# Patient Record
Sex: FEMALE | URBAN - METROPOLITAN AREA
[De-identification: names, ages, dates, MRNs, and addresses within clinical notes are randomized per-mention and may not be internally consistent; named-entity substitution may affect disease eponyms.]

---

## 2020-10-20 ENCOUNTER — VIRTUAL VISIT (OUTPATIENT)
Dept: FAMILY MEDICINE CLINIC | Age: 40
End: 2020-10-20

## 2020-10-20 ENCOUNTER — TELEPHONE (OUTPATIENT)
Dept: FAMILY MEDICINE CLINIC | Age: 40
End: 2020-10-20

## 2020-10-20 DIAGNOSIS — I10 ESSENTIAL HYPERTENSION: Primary | ICD-10-CM

## 2020-10-20 DIAGNOSIS — G47.00 INSOMNIA, UNSPECIFIED TYPE: ICD-10-CM

## 2020-10-20 DIAGNOSIS — K76.0 FATTY LIVER: ICD-10-CM

## 2020-10-20 DIAGNOSIS — R93.89 THICKENED ENDOMETRIUM: ICD-10-CM

## 2020-10-20 DIAGNOSIS — R79.89 ELEVATED LFTS: ICD-10-CM

## 2020-10-20 DIAGNOSIS — R73.01 IMPAIRED FASTING BLOOD SUGAR: ICD-10-CM

## 2020-10-20 DIAGNOSIS — E66.9 CLASS 1 OBESITY WITH SERIOUS COMORBIDITY IN ADULT, UNSPECIFIED BMI, UNSPECIFIED OBESITY TYPE: ICD-10-CM

## 2020-10-20 DIAGNOSIS — E78.2 MIXED HYPERLIPIDEMIA: ICD-10-CM

## 2020-10-20 PROCEDURE — 99203 OFFICE O/P NEW LOW 30 MIN: CPT | Performed by: INTERNAL MEDICINE

## 2020-10-20 RX ORDER — LISINOPRIL AND HYDROCHLOROTHIAZIDE 10; 12.5 MG/1; MG/1
TABLET ORAL DAILY
COMMUNITY
End: 2020-12-09 | Stop reason: SDUPTHER

## 2020-10-20 RX ORDER — SIMVASTATIN 40 MG/1
TABLET, FILM COATED ORAL
COMMUNITY
End: 2020-10-20 | Stop reason: SDUPTHER

## 2020-10-20 RX ORDER — AMLODIPINE BESYLATE 2.5 MG/1
2.5 TABLET ORAL DAILY
COMMUNITY
End: 2020-10-20 | Stop reason: ALTCHOICE

## 2020-10-20 RX ORDER — SIMVASTATIN 40 MG/1
40 TABLET, FILM COATED ORAL
Qty: 90 TAB | Refills: 1 | Status: SHIPPED | OUTPATIENT
Start: 2020-10-20 | End: 2021-01-20 | Stop reason: SDUPTHER

## 2020-10-20 RX ORDER — NORETHINDRONE ACETATE AND ETHINYL ESTRADIOL 1MG-20(21)
KIT ORAL
COMMUNITY
End: 2022-08-22 | Stop reason: ALTCHOICE

## 2020-10-20 NOTE — PROGRESS NOTES
Chief Complaint   Patient presents with   [facility] Establish Care    Neck Pain     Will get flu shot at pharmacy. OB/[facility]N [facility]-annual on 11/7/2020.

## 2020-10-20 NOTE — PROGRESS NOTES
I have attempted without success to contact patient by phone to check in for virtual visit.   [phone]

## 2020-10-20 NOTE — PROGRESS NOTES
Adair Townsend is a 46 y.o. female who was seen by synchronous (real-time) audio-video technology on 10/20/2020 for Establish Care and Neck Pain        Assessment & Plan:   Diagnoses and all orders for this visit:    1. Essential hypertension-advised home monitoring; plan is to discontinue low dose Norvasc and double up on the dose of Lisinopril HCT if BP goes over 135/85  -     CBC WITH AUTOMATED DIFF; Future    2. Mixed hyperlipidemia-goal LDL is less than 70 bec of DM  -     METABOLIC PANEL, COMPREHENSIVE; Future  -     LIPID PANEL; Future  -     simvastatin (ZOCOR) 40 mg tablet; Take 1 Tab by mouth nightly. 3. Impaired fasting blood sugar-advised weight loss  -     HEMOGLOBIN A1C WITH EAG; Future    4. Elevated LFTs-likely from fatty liver seen on US; may need to back off a bit on statin if this is higher than it was    5. Class 1 obesity with serious comorbidity in adult, unspecified BMI, unspecified obesity type-discussed limiting delroy to 7126-8211/day and exercising at least 150 min a week    6. Insomnia, unspecified type-advised on sleep hygiene and to try Melatonin first 3 mg to 10 mg at night    7. Thickened endometrium-on OCP for this; has follow up ith Gyn soon    8. Fatty liver-has not seen GI for this; weight loss imperative    9. 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        [id number]  Subjective:     Health Maintenance Due   Topic Date Due    A1C test (Diabetic or Prediabetic)  12/19/1991    Lipid Screen  12/19/1991    DTaP/Tdap/Td series (1 - Tdap) 12/19/2002    PAP AKA CERVICAL CYTOLOGY  12/19/2002    Flu Vaccine (1) 09/01/2020     First visit with me; recommended by my patient Jaqueline Paget  Was prev seeing Guillermina ren-not seen in a while, Dr was on maternity leave then pandemic happened    On Lisinopril HCT and Norvasc  Home BP high so Amlodipine added recently, but found out recently that BP cuff not calibrated when she brought this to her dad'd doctor's appt and the discrepancy was high; she will try to get a new one    FBS was 106 blood sugar 10/2019  Mom with DM  Last A1C was 5.6 in 2015  Denies polyuria, polydipsia and polyphagia      LIPID COMPLETE PANELResulted: 10/31/2019 9:29 PM  [address]  Component Name Value Ref Range   Cholesterol 180 110 - 200 mg/dL   Triglyceride 180 (H) 40 - 149 mg/dL   HDL 42 40 - 59 mg/dL   Cholesterol/HDL 4.3 0.0 - 5.0    LDL CALCULATION 102 (H) 50 - 99 mg/dL   VLDL CALCULATION 36 (H) 8 - 30 mg/dL   On Simvastatin-needs refill  [facility] chart reviewed: prior to starting Simvastatin, her cholesterol levels were over 300      On OCP-restarted it bec of thickening of endometrial lining-[facility]  appt next month  However, OCP is not controlling amount of menses which it was supposed to do instead of undergoing a D and C- some periods are very heavy and some are almost nonthing      elev LFT to 116 ALT in 2018, then down to 50 in 2019-US showed fatty liver    Prior to Admission medications    Medication Sig Start Date End Date Taking? Authorizing Provider   lisinopril-hydroCHLOROthiazide (PRINZIDE, ZESTORETIC) 10-12.5 mg per tablet Take  by mouth daily. Yes Provider, Historical   amLODIPine (NORVASC) 2.5 mg tablet Take 2.5 mg by mouth daily. Yes Provider, Historical   simvastatin (ZOCOR) 40 mg tablet Take  by mouth nightly.    Yes Provider, Historical   norethindrone-ethinyl estradiol (Junel FE 1/20, 28,) 1 mg-20 mcg (21)/75 mg (7) tab Junel FE 1/20 (28) 1 mg-20 mcg (21)/75 mg (7) tablet   Yes Provider, Historical     Patient Active Problem List    Diagnosis Date Noted    Essential hypertension 10/20/2020    Mixed hyperlipidemia 10/20/2020    Impaired fasting blood sugar 10/20/2020    Elevated LFTs 10/20/2020    Class 1 obesity with serious comorbidity in adult 10/20/2020    Insomnia 10/20/2020    Thickened endometrium 10/20/2020    Fatty liver 10/20/2020     Family History   Problem Relation Age of Onset    Stroke Mother     Hypertension Mother     No Known Problems Father      ROS  Pt denies: Wt loss, Fever/Chills, HA, Visual changes, Fatigue, Chest pain, SOB, COELLO, Abd pain, N/V/D/C, Blood in stool or urine, Edema. Pertinent positive as above in HPI. All others were negative  Objective:   No flowsheet data found. General: alert, cooperative, no distress   Mental  status: normal mood, behavior, speech, dress, motor activity, and thought processes, able to follow commands   HENT: NCAT   Neck: no visualized mass   Resp: no respiratory distress   Neuro: no gross deficits   Skin: no discoloration or lesions of concern on visible areas   Psychiatric: normal affect, consistent with stated mood, no evidence of hallucinations     Additional exam findings: obese      We discussed the expected course, resolution and complications of the diagnosis(es) in detail. Medication risks, benefits, costs, interactions, and alternatives were discussed as indicated. I advised her to contact the office if her condition worsens, changes or fails to improve as anticipated. She expressed understanding with the diagnosis(es) and plan. Dae Aguilera, who was evaluated through a patient-initiated, synchronous (real-time) audio-video encounter, and/or her healthcare decision maker, is aware that it is a billable service, with coverage as determined by her insurance carrier. She provided verbal consent to proceed: Yes, and patient identification was verified. It was conducted pursuant to the emergency declaration under the [address] [place] [address], [address] [place] [address] authority and the [employer] and [employer] General Act. A caregiver was present when appropriate.  Ability to conduct physical exam was limited. I was at home. The patient was at home.       Amber Bright MD

## 2020-10-20 NOTE — PATIENT INSTRUCTIONS
Starting a Weight Loss Plan: Care Instructions Your Care Instructions If you are thinking about losing weight, it can be hard to know where to start. Your doctor can help you set up a weight loss plan that best meets your needs. You may want to take a class on nutrition or exercise, or join a weight loss support group. If you have questions about how to make changes to your eating or exercise habits, ask your doctor about seeing a registered dietitian or an exercise specialist. 
It can be a big challenge to lose weight. But you do not have to make huge changes at once. Make small changes, and stick with them. When those changes become habit, add a few more changes. If you do not think you are ready to make changes right now, try to pick a date in the future. Make an appointment to see your doctor to discuss whether the time is right for you to start a plan. Follow-up care is a key part of your treatment and safety. Be sure to make and go to all appointments, and call your doctor if you are having problems. It's also a good idea to know your test results and keep a list of the medicines you take. How can you care for yourself at home? · Set realistic goals. Many people expect to lose much more weight than is likely. A weight loss of 5% to 10% of your body weight may be enough to improve your health. · Get family and friends involved to provide support. Talk to them about why you are trying to lose weight, and ask them to help. They can help by participating in exercise and having meals with you, even if they may be eating something different. · Find what works best for you. If you do not have time or do not like to cook, a program that offers meal replacement bars or shakes may be better for you. Or if you like to prepare meals, finding a plan that includes daily menus and recipes may be best. 
· Ask your doctor about other health professionals who can help you achieve your weight loss goals. ? A dietitian can help you make healthy changes in your diet. ? An exercise specialist or  can help you develop a safe and effective exercise program. 
? A counselor or psychiatrist can help you cope with issues such as depression, anxiety, or family problems that can make it hard to focus on weight loss. · Consider joining a support group for people who are trying to lose weight. Your doctor can suggest groups in your area. Where can you learn more? Go to http://www.gray.com/ Enter S554 in the search box to learn more about \"Starting a Weight Loss Plan: Care Instructions. \" Current as of: December 11, 2019               Content Version: 12.6 © 2131-8895 [employer]. Care instructions adapted under license by [employer] (which disclaims liability or warranty for this information). If you have questions about a medical condition or this instruction, always ask your healthcare professional. [address] any warranty or liability for your use of this information.

## 2020-11-17 LAB
A-G RATIO,AGRAT: 1.4 RATIO (ref 1.1–2.6)
ABSOLUTE LYMPHOCYTE COUNT, 10803: 3.5 K/UL (ref 1–4.8)
ALBUMIN SERPL-MCNC: 4.8 G/DL (ref 3.5–5)
ALP SERPL-CCNC: 53 U/L (ref 25–115)
ALT SERPL-CCNC: 30 U/L (ref 5–40)
ANION GAP SERPL CALC-SCNC: 15.4 MMOL/L (ref 3–15)
AST SERPL W P-5'-P-CCNC: 24 U/L (ref 10–37)
BASOPHILS # BLD: 0.2 K/UL (ref 0–0.2)
BASOPHILS NFR BLD: 2 % (ref 0–2)
BILIRUB SERPL-MCNC: 0.6 MG/DL (ref 0.2–1.2)
BUN SERPL-MCNC: 9 MG/DL (ref 6–22)
CALCIUM SERPL-MCNC: 10.1 MG/DL (ref 8.4–10.5)
CHLORIDE SERPL-SCNC: 99 MMOL/L (ref 98–110)
CHOLEST SERPL-MCNC: 247 MG/DL (ref 110–200)
CO2 SERPL-SCNC: 23 MMOL/L (ref 20–32)
CREAT SERPL-MCNC: 0.4 MG/DL (ref 0.5–1.2)
EOSINOPHIL # BLD: 0.5 K/UL (ref 0–0.5)
EOSINOPHIL NFR BLD: 5 % (ref 0–6)
ERYTHROCYTE [DISTWIDTH] IN BLOOD BY AUTOMATED COUNT: 12.4 % (ref 10–15.5)
GFRAA, 66117: >60
GFRNA, 66118: >60
GLOBULIN,GLOB: 3.5 G/DL (ref 2–4)
GLUCOSE SERPL-MCNC: 92 MG/DL (ref 70–99)
GRANULOCYTES,GRANS: 53 % (ref 40–75)
HCT VFR BLD AUTO: 41.7 % (ref 35.1–46.5)
HDLC SERPL-MCNC: 44 MG/DL
HDLC SERPL-MCNC: 5.6 MG/DL (ref 0–5)
HGB BLD-MCNC: 14.2 G/DL (ref 11.7–15.5)
LDL, DIRECT,DLDL: 164 MG/DL (ref 50–99)
LDL/HDL RATIO,LDHD: 3.7
LDLC SERPL CALC-MCNC: ABNORMAL MG/DL
LYMPHOCYTES, LYMLT: 35 % (ref 20–45)
MCH RBC QN AUTO: 30 PG (ref 26–34)
MCHC RBC AUTO-ENTMCNC: 34 G/DL (ref 31–36)
MCV RBC AUTO: 89 FL (ref 81–99)
MONOCYTES # BLD: 0.6 K/UL (ref 0.1–1)
MONOCYTES NFR BLD: 6 % (ref 3–12)
NEUTROPHILS # BLD AUTO: 5.3 K/UL (ref 1.8–7.7)
NON-HDL CHOLESTEROL, 011976: 203 MG/DL
PLATELET # BLD AUTO: 358 K/UL (ref 140–440)
PMV BLD AUTO: 10.1 FL (ref 9–13)
POTASSIUM SERPL-SCNC: 3.9 MMOL/L (ref 3.5–5.5)
PROT SERPL-MCNC: 8.3 G/DL (ref 6.4–8.3)
RBC # BLD AUTO: 4.68 M/UL (ref 3.8–5.2)
SODIUM SERPL-SCNC: 137 MMOL/L (ref 133–145)
TRIGL SERPL-MCNC: 453 MG/DL (ref 40–149)
VLDLC SERPL CALC-MCNC: 91 MG/DL (ref 8–30)
WBC # BLD AUTO: 10.1 K/UL (ref 4–11)

## 2020-11-18 LAB
AVG GLU, 10930: 122 MG/DL (ref 91–123)
HBA1C MFR BLD HPLC: 5.9 % (ref 4.8–5.6)

## 2021-01-20 ENCOUNTER — VIRTUAL VISIT (OUTPATIENT)
Dept: FAMILY MEDICINE CLINIC | Age: 41
End: 2021-01-20
Payer: COMMERCIAL

## 2021-01-20 DIAGNOSIS — E78.2 MIXED HYPERLIPIDEMIA: ICD-10-CM

## 2021-01-20 DIAGNOSIS — G47.00 INSOMNIA, UNSPECIFIED TYPE: ICD-10-CM

## 2021-01-20 DIAGNOSIS — I10 ESSENTIAL HYPERTENSION: Primary | ICD-10-CM

## 2021-01-20 DIAGNOSIS — R73.01 IMPAIRED FASTING BLOOD SUGAR: ICD-10-CM

## 2021-01-20 DIAGNOSIS — K76.0 FATTY LIVER: ICD-10-CM

## 2021-01-20 PROCEDURE — 99214 OFFICE O/P EST MOD 30 MIN: CPT | Performed by: INTERNAL MEDICINE

## 2021-01-20 RX ORDER — SIMVASTATIN 40 MG/1
40 TABLET, FILM COATED ORAL
Qty: 90 TAB | Refills: 1 | Status: SHIPPED | OUTPATIENT
Start: 2021-01-20 | End: 2021-07-29 | Stop reason: SDUPTHER

## 2021-01-20 NOTE — PATIENT INSTRUCTIONS

## 2021-01-20 NOTE — PROGRESS NOTES
Iris David is a 45 y.o. female who was seen by synchronous (real-time) audio-video technology on 1/20/2021 for Follow Up Chronic Condition (3 month; HTN, Cholesterol)        Assessment & Plan:   Diagnoses and all orders for this visit:    1. Essential hypertension-continue with Lisinopril/HCT and home monitoring  -     CBC WITH AUTOMATED DIFF; Future  -     METABOLIC PANEL, COMPREHENSIVE; Future    2. Mixed hyperlipidemia-continue with low fat diet, Zocor and fish oil; patient wants to know first if her addition of  fish oil will work prior to me changing the Zocor to another statin such as Crestor or Lipitor  -     simvastatin (ZOCOR) 40 mg tablet; Take 1 Tab by mouth nightly. -     LIPID PANEL; Future    3. Impaired fasting blood sugar-continue with efforts at weight loss  -     METABOLIC PANEL, COMPREHENSIVE; Future  -     HEMOGLOBIN A1C WITH EAG; Future    4. Insomnia, unspecified type-resolved    5. Fatty liver-continue with weight loss efforts        Follow-up and Dispositions    · Return in about 3 months (around 4/20/2021) for follow up. Routing History      will do labs at [facility] 1 week prior to appt  7will do labs at [facility] 1 week prior to appt12  Subjective:     Health Maintenance Due   Topic Date Due    PAP AKA CERVICAL CYTOLOGY -c/o gyn 12/19/2002    Flu Vaccine (1)-will get it this month 09/01/2020     Follow up from last visit:  1. Essential hypertension-advised home monitoring; plan is to discontinue low dose Norvasc and double up on the dose of Lisinopril HCT if BP goes over 135/85  -     CBC WITH AUTOMATED DIFF; Future     Home BPs without low dose Norvasc-121-83/85      2. Mixed hyperlipidemia-goal LDL is less than 70 bec of DM  -     METABOLIC PANEL, COMPREHENSIVE; Future  -     LIPID PANEL; Future  -     simvastatin (ZOCOR) 40 mg tablet;  Take 1 Tab by mouth nightly.     Lab Results   Component Value Date/Time    Cholesterol, total 247 (H) 11/17/2020 08:22 AM    HDL Cholesterol 44 11/17/2020 08:22 AM    LDL,Direct 164 (H) 11/17/2020 08:22 AM    LDL, calculated  11/17/2020 08:22 AM      Comment:      Triglyceride value is too high to calculate LDL. Lyndsey Fuentes Direct LDL is being  performed. VLDL, calculated 91 (H) 11/17/2020 08:22 AM    Triglyceride 453 (H) 11/17/2020 08:22 AM   Zocor- missed doses  Also taking fish oil which helped her before; now also shifted to  brown rice    3. Impaired fasting blood sugar-advised weight loss  -     HEMOGLOBIN A1C WITH EAG; Future     Lab Results   Component Value Date/Time    Hemoglobin A1c 5.9 (H) 11/17/2020 08:22 AM   Denies polyuria, polydipsia and polyphagia      4. Elevated LFTs-likely from fatty liver seen on US; may need to back off a bit on statin if this is higher than it was     Lab Results   Component Value Date/Time    ALT (SGPT) 30 11/17/2020 08:22 AM    AST (SGOT) 24 11/17/2020 08:22 AM    Alk. phosphatase 53 11/17/2020 08:22 AM    Bilirubin, total 0.6 11/17/2020 08:22 AM     5. Class 1 obesity with serious comorbidity in adult, unspecified BMI, unspecified obesity type-discussed limiting delroy to 7670-8116/day and exercising at least 150 min a week     recent weight-has not checked/scared     6. Insomnia, unspecified type-advised on sleep hygiene and to try Melatonin first 3 mg to 10 mg at night    Melatonin did not work  But insomnia is resolved since     7. Thickened endometrium-on OCP for this; has follow up ith Gyn soon   Saw Gyn-fibroids same size; up to her to have ablation she was told; wants to get off OCP which makes her gain weight and also with hair loss  No anemia    8. Fatty liver-has not seen GI for this; weight loss imperative     9. 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 Authorizing Provider   docosahexaenoic acid/epa (FISH OIL PO) Take 2,400 mg by mouth. Yes Provider, Historical   simvastatin (ZOCOR) 40 mg tablet Take 1 Tab by mouth nightly. 1/20/21  Yes Nina Alarcon MD   lisinopril-hydroCHLOROthiazide (PRINZIDE, ZESTORETIC) 10-12.5 mg per tablet Take 1 Tab by mouth daily. 12/9/20  Yes Marissa Adame MD   norethindrone-ethinyl estradiol (Junel FE 1/20, 28,) 1 mg-20 mcg (21)/75 mg (7) tab Junel FE 1/20 (28) 1 mg-20 mcg (21)/75 mg (7) tablet   Yes Provider, Historical   simvastatin (ZOCOR) 40 mg tablet Take 1 Tab by mouth nightly. 10/20/20 1/20/21  Marissa Adame MD     Patient Active Problem List    Diagnosis Date Noted    Essential hypertension 10/20/2020    Mixed hyperlipidemia 10/20/2020    Impaired fasting blood sugar 10/20/2020    Elevated LFTs 10/20/2020    Class 1 obesity with serious comorbidity in adult 10/20/2020    Insomnia 10/20/2020    Thickened endometrium 10/20/2020    Fatty liver 10/20/2020       ROS  Pt denies: Wt loss, Fever/Chills, HA, Visual changes, Fatigue, Chest pain, SOB, COELLO, Abd pain, N/V/D/C, Blood in stool or urine, Edema. Pertinent positive as above in HPI. All others were negative  Objective:   No flowsheet data found. General: alert, cooperative, no distress   Mental  status: normal mood, behavior, speech, dress, motor activity, and thought processes, able to follow commands   HENT: NCAT   Neck: no visualized mass   Resp: no respiratory distress   Neuro: no gross deficits   Skin: no discoloration or lesions of concern on visible areas   Psychiatric: normal affect, consistent with stated mood, no evidence of hallucinations     Additional exam findings: We discussed the expected course, resolution and complications of the diagnosis(es) in detail. Medication risks, benefits, costs, interactions, and alternatives were discussed as indicated.   I advised her to contact the office if her condition worsens, changes or fails to improve as anticipated. She expressed understanding with the diagnosis(es) and plan.       Enma Burroughs, who was evaluated through a patient-initiated, synchronous (real-time) audio-video encounter, and/or her healthcare decision maker, is aware that it is a billable service, with coverage as determined by her insurance carrier. She provided verbal consent to proceed: Yes, and patient identification was verified. It was conducted pursuant to the emergency declaration under the [place] Act and the National Emergencies Act, 1135 waiver authority and the Coronavirus Preparedness and Response Supplemental Appropriations Act. A caregiver was present when appropriate. Ability to conduct physical exam was limited. I was at home. The patient was at home.      Nina Alarcon MD

## 2021-01-20 NOTE — PROGRESS NOTES
Chief Complaint   Patient presents with    Follow Up Chronic Condition     3 month; HTN, Cholesterol       1. Have you been to the ER, urgent care clinic since your last visit? Hospitalized since your last visit? No    2. Have you seen or consulted any other health care providers outside of the [address] [place] [address] since your last visit? Include any pap smears or colon screening.  No     Health Maintenance Due   Topic    PAP AKA CERVICAL CYTOLOGY     Flu Vaccine (1)

## 2021-01-20 NOTE — PROGRESS NOTES
I have attempted without success to contact patient by phone to check in for virtual visit.   [phone] (H)

## 2021-06-03 ENCOUNTER — OFFICE VISIT (OUTPATIENT)
Dept: FAMILY MEDICINE CLINIC | Age: 41
End: 2021-06-03
Payer: COMMERCIAL

## 2021-06-03 VITALS
DIASTOLIC BLOOD PRESSURE: 89 MMHG | HEART RATE: 100 BPM | SYSTOLIC BLOOD PRESSURE: 139 MMHG | TEMPERATURE: 98.3 F | BODY MASS INDEX: 25.51 KG/M2 | WEIGHT: 138.6 LBS | HEIGHT: 62 IN | RESPIRATION RATE: 16 BRPM

## 2021-06-03 DIAGNOSIS — I10 ESSENTIAL HYPERTENSION: Primary | ICD-10-CM

## 2021-06-03 DIAGNOSIS — R73.01 IMPAIRED FASTING BLOOD SUGAR: ICD-10-CM

## 2021-06-03 DIAGNOSIS — D21.9 FIBROID: ICD-10-CM

## 2021-06-03 DIAGNOSIS — E78.2 MIXED HYPERLIPIDEMIA: ICD-10-CM

## 2021-06-03 PROCEDURE — 99214 OFFICE O/P EST MOD 30 MIN: CPT | Performed by: INTERNAL MEDICINE

## 2021-06-03 RX ORDER — LISINOPRIL AND HYDROCHLOROTHIAZIDE 10; 12.5 MG/1; MG/1
1 TABLET ORAL DAILY
Qty: 90 TABLET | Refills: 0 | Status: SHIPPED | OUTPATIENT
Start: 2021-06-03 | End: 2021-07-29

## 2021-06-03 NOTE — PATIENT INSTRUCTIONS
Uterine Fibroid Embolization: Before Your Procedure What is uterine fibroid embolization? Uterine fibroid embolization is a treatment to destroy or shrink fibroids. Fibroids are growths on or in your uterus. Sometimes they're called fibroid tumors, but they aren't cancer. You will be awake during the procedure. You will get medicine to help you relax and to help with pain. First the doctor will put a thin, flexible tube into blood vessels in both of your upper thighs. The tube is called a catheter. Then the doctor sends small particles through the catheter. These particles prevent your fibroids from getting blood. Without blood, the fibroids shrink or die. The treatment usually takes 1 to 3 hours. Most women go home 6 to 24 hours later. You may have some pain for a few hours to a few days. It will probably take about 7 to 10 days to fully recover. This treatment should reduce pain and bleeding. It may also prevent fibroids from growing back. After the treatment, less blood will go to your uterus. Because of this, pregnancy is not recommended. So it's important to talk with your doctor about birth control options. Follow-up care is a key part of your treatment and safety. Be sure to make and go to all appointments, and call your doctor if you are having problems. It's also a good idea to know your test results and keep a list of the medicines you take. How do you prepare for the procedure? Procedures can be stressful. This information will help you understand what you can expect. And it will help you safely prepare for your procedure. Preparing for the procedure 
  · Be sure you have someone to take you home.  Anesthesia and pain medicine will make it unsafe for you to drive or get home on your own.  
  · Understand exactly what procedure is planned, along with the risks, benefits, and other options.  
  · If you take aspirin or some other blood thinner, ask your doctor if you should stop taking it before your procedure. Make sure that you understand exactly what your doctor wants you to do. These medicines increase the risk of bleeding.  
  · Tell your doctor ALL the medicines, vitamins, supplements, and herbal remedies you take. Some may increase the risk of problems during your procedure. Your doctor will tell you if you should stop taking any of them before the procedure and how soon to do it.  
  · Make sure your doctor and the hospital have a copy of your advance directive. If you don't have one, you may want to prepare one. It lets others know your health care wishes. It's a good thing to have before any type of surgery or procedure. What happens on the day of the procedure? · Follow the instructions exactly about when to stop eating and drinking. If you don't, your procedure may be canceled. If your doctor told you to take your medicines on the day of the procedure, take them with only a sip of water.  
  · Take a bath or shower before you come in for your procedure. Do not apply lotions, perfumes, deodorants, or nail polish.  
  · Take off all jewelry and piercings. And take out contact lenses, if you wear them. At the hospital or surgery center · Bring a picture ID.  
  · You will be kept comfortable and safe by your anesthesia provider. You may get medicine that relaxes you or puts you in a light sleep. The area being worked on will be numb.  
  · The procedure will take about 1 to 3 hours. When should you call your doctor? · You have questions or concerns.  
  · You don't understand how to prepare for your procedure.  
  · You become ill before the procedure (such as fever, flu, or a cold).  
  · You need to reschedule or have changed your mind about having the procedure. Where can you learn more? Go to http://www.gray.com/ Enter 60 [phone] 44 in the search box to learn more about \"Uterine Fibroid Embolization: Before Your Procedure. \" Current as of: July 17, 2020               Content Version: 12.8 © 5754-2945 [employer]orporated. Care instructions adapted under license by [employer] (which disclaims liability or warranty for this information). If you have questions about a medical condition or this instruction, always ask your healthcare professional. [address] any warranty or liability for your use of this information.

## 2021-06-03 NOTE — PROGRESS NOTES
Assessment/ Plan:   Diagnoses and all orders for this visit:    1. Essential hypertension  -     CBC WITH AUTOMATED DIFF; Future  -     METABOLIC PANEL, COMPREHENSIVE; Future    2. Mixed hyperlipidemia  -     LIPID PANEL; Future    3. Impaired fasting blood sugar  -     HEMOGLOBIN A1C WITH EAG; Future    4. Fibroid    Other orders  -     lisinopril-hydroCHLOROthiazide (PRINZIDE, ZESTORETIC) 10-12.5 mg per tablet; Take 1 Tablet by mouth daily. Follow-up and Dispositions    · Return in about 3 months (around 9/3/2021) for follow up. Chief Complaint   Patient presents with    Follow Up Chronic Condition       Pt is a 45y.o. year old female who presents for follow up of her chronic medical problems    Health Maintenance Due   Topic Date Due    PAP AKA CERVICAL CYTOLOGY -Nov at [address] [place] [address] for Women Never done   Off OCP for 1 Yr-BP better but with fibroids she was started on OCP again (intermittent heavy menses)    BP Readings from Last 3 Encounters:   06/03/21 139/89   Home BPs-slightly elevated  Off Norvasc       Wt Readings from Last 3 Encounters:   06/03/21 138 lb 9.6 oz (62.9 kg)       Lab Results   Component Value Date/Time    Cholesterol, total 247 (H) 11/17/2020 08:22 AM    HDL Cholesterol 44 11/17/2020 08:22 AM    LDL,Direct 164 (H) 11/17/2020 08:22 AM    LDL, calculated  11/17/2020 08:22 AM      Comment:      Triglyceride value is too high to calculate LDL. Christo [facility] Direct LDL is being  performed. VLDL, calculated 91 (H) 11/17/2020 08:22 AM    Triglyceride 453 (H) 11/17/2020 08:22 AM   On Zocor higher dose since      Lab Results   Component Value Date/Time    Hemoglobin A1c 5.9 (H) 11/17/2020 08:22 AM   Denies polyuria, polydipsia and polyphagia  Strong family hx of DM  ROS:    Pt denies: Wt loss, Fever/Chills, HA, Visual changes, Fatigue, Chest pain, SOB, COELLO, Abd pain, N/V/D/C, Blood in stool or urine, Edema. Pertinent positive as above in HPI.  All others were negative    Patient Active Problem List   Diagnosis Code    Essential hypertension I10    Mixed hyperlipidemia E78.2    Impaired fasting blood sugar R73.01    Elevated LFTs R79.89    Class 1 obesity with serious comorbidity in adult E66.9    Insomnia G47.00    Thickened endometrium R93.89    Fatty liver K76.0       Past Medical History:   Diagnosis Date    Hypercholesterolemia     Hypertension        Current Outpatient Medications   Medication Sig Dispense Refill    lisinopril-hydroCHLOROthiazide (PRINZIDE, ZESTORETIC) 10-12.5 mg per tablet Take 1 Tablet by mouth daily. 90 Tablet 0    docosahexaenoic acid/epa (FISH OIL PO) Take 2,400 mg by mouth.  simvastatin (ZOCOR) 40 mg tablet Take 1 Tab by mouth nightly. 90 Tab 1    norethindrone-ethinyl estradiol (Junel FE 1/20, 28,) 1 mg-20 mcg (21)/75 mg (7) tab Junel FE 1/20 (28) 1 mg-20 mcg (21)/75 mg (7) tablet         Social History     Tobacco Use   Smoking Status Never Smoker   Smokeless Tobacco Never Used       No Known Allergies    Patient Labs were reviewed: yes    Patient Past Records were reviewed: yes      Objective:     Vitals:    06/03/21 1331   BP: 139/89   Pulse: 100   Resp: 16   Temp: 98.3 °F (36.8 °C)   TempSrc: Temporal   Weight: 138 lb 9.6 oz (62.9 kg)   Height: 5' 2\" (1.575 m)     Body mass index is 25.35 kg/m². Exam:   Appearance: alert, well appearing,  oriented to person, place, and time, acyanotic, in no respiratory distress and well hydrated. HEENT:  NC/AT, pink conj, anicteric sclerae  Neck:  No cervical lymphadenopathy, no JVD, no thyromegaly, no carotid bruit  Heart:  RRR without M/R/G  Lungs:  CTAB, no rhonchi, rales, or wheezes with good air exchange   Abdomen:  Non-tender, pos bowel sounds, no hepatosplenomegaly  Ext:  No C/C/E    Skin: no rash  Neuro: no lateralizing signs, CNs II-XII intact            I have discussed the diagnosis with the patient and the intended plan as seen in the above orders.   The patient has received an After-Visit Summary and questions were answered concerning future plans. Medication Side Effects and Warnings were discussed with patient: yes    Patient verbalized understanding of above instructions.     Juliana Salazar MD  Internal Medicine  [facility]

## 2021-06-03 NOTE — PROGRESS NOTES
1. Have you been to the ER, urgent care clinic since your last visit? Hospitalized since your last visit? No    2. Have you seen or consulted any other health care providers outside of the [address] [place] [address] since your last visit? Include any pap smears or colon screening.  No     Health Maintenance Due   Topic Date Due    PAP AKA CERVICAL CYTOLOGY  Never done

## 2021-06-18 LAB
CREATININE, EXTERNAL: 0.5
HBA1C MFR BLD HPLC: 5.9 %
LDL-C, EXTERNAL: 73

## 2021-07-29 ENCOUNTER — HOSPITAL ENCOUNTER (OUTPATIENT)
Dept: LAB | Age: 41
Discharge: HOME | End: 2021-07-29
Payer: COMMERCIAL

## 2021-07-29 ENCOUNTER — OFFICE VISIT (OUTPATIENT)
Dept: FAMILY MEDICINE CLINIC | Age: 41
End: 2021-07-29
Payer: COMMERCIAL

## 2021-07-29 VITALS
WEIGHT: 140 LBS | BODY MASS INDEX: 25.76 KG/M2 | SYSTOLIC BLOOD PRESSURE: 142 MMHG | DIASTOLIC BLOOD PRESSURE: 100 MMHG | HEART RATE: 99 BPM | HEIGHT: 62 IN

## 2021-07-29 DIAGNOSIS — M25.471 RIGHT ANKLE SWELLING: ICD-10-CM

## 2021-07-29 DIAGNOSIS — I10 ESSENTIAL HYPERTENSION: ICD-10-CM

## 2021-07-29 DIAGNOSIS — D21.9 FIBROID: ICD-10-CM

## 2021-07-29 DIAGNOSIS — R73.01 IMPAIRED FASTING BLOOD SUGAR: ICD-10-CM

## 2021-07-29 DIAGNOSIS — M25.471 RIGHT ANKLE SWELLING: Primary | ICD-10-CM

## 2021-07-29 DIAGNOSIS — E78.2 MIXED HYPERLIPIDEMIA: ICD-10-CM

## 2021-07-29 LAB — URATE SERPL-MCNC: 8.4 MG/DL (ref 2.6–7.2)

## 2021-07-29 PROCEDURE — 99214 OFFICE O/P EST MOD 30 MIN: CPT | Performed by: INTERNAL MEDICINE

## 2021-07-29 PROCEDURE — 36415 COLL VENOUS BLD VENIPUNCTURE: CPT

## 2021-07-29 PROCEDURE — 84550 ASSAY OF BLOOD/URIC ACID: CPT

## 2021-07-29 RX ORDER — SIMVASTATIN 40 MG/1
40 TABLET, FILM COATED ORAL
Qty: 90 TABLET | Refills: 1 | Status: SHIPPED | OUTPATIENT
Start: 2021-07-29 | End: 2022-02-04 | Stop reason: SDUPTHER

## 2021-07-29 RX ORDER — AMLODIPINE BESYLATE 5 MG/1
5 TABLET ORAL DAILY
Qty: 90 TABLET | Refills: 0 | Status: SHIPPED | OUTPATIENT
Start: 2021-07-29 | End: 2021-09-23 | Stop reason: SINTOL

## 2021-07-29 NOTE — PROGRESS NOTES
Patient seen for f/u Er visit, Right heel swollen with pain  1. Have you been to the ER, urgent care clinic since your last visit? Hospitalized since your last visit? Yes for right heel pain    2. Have you seen or consulted any other health care providers outside of the [address] [place] [address] since your last visit? Include any pap smears or colon screening.  No     Health Maintenance Due   Topic Date Due    PAP AKA CERVICAL CYTOLOGY  Never done

## 2021-07-29 NOTE — PROGRESS NOTES
Assessment/ Plan:   Diagnoses and all orders for this visit:    1. Right ankle swelling- there appears to be a bony protrusion on the Achilles' tendon/but no bone spur seen on ER Xray; advised on low purine diet; may take prn OTC NSAIDs and given exercises for plantar fasciitis as most of her pain is on the heel  -     URIC ACID; Future  -     REFERRAL TO PODIATRY    2. Mixed hyperlipidemia-if trig still elev next visit, will add Vascepa  -     simvastatin (ZOCOR) 40 mg tablet; Take 1 Tablet by mouth nightly. 3. Essential hypertension-change Lisinopril/HCT to Norvasc bec if her heel issue is from gout, she should not be taking diuretics; likewise she is thinking of getting off the OCP and since she is still of childbearing age, Lisinopril may be teratogenic  -     amLODIPine (NORVASC) 5 mg tablet; Take 1 Tablet by mouth daily. 4. Impaired fasting blood sugar-continue with efforts at weight loss    5. Fibroid-patient is thinking about getting an ablation so she could get off OCP which may be contributing to her elev BP        Follow-up and Dispositions    · Return in about 3 months (around 10/29/2021) for follow up.            Chief Complaint   Patient presents with    Follow-up     er visit for swollen right heel       Pt is a 45y.o. year old female who presents for follow up of her chronic medical problems    Health Maintenance Due   Topic Date Due    PAP AKA CERVICAL CYTOLOGY -c/o Gyn Never done      Has not yet done labs from last visit in June    BP Readings from Last 3 Encounters:   07/29/21 (!) 142/100   06/03/21 139/89   home BPs also elevated-lowest is 135/90's  Off Norvasc    Wt Readings from Last 3 Encounters:   07/29/21 140 lb (63.5 kg)   06/03/21 138 lb 9.6 oz (62.9 kg)       Lab Results   Component Value Date/Time    Cholesterol, total 247 (H) 11/17/2020 08:22 AM    HDL Cholesterol 44 11/17/2020 08:22 AM    LDL,Direct 164 (H) 11/17/2020 08:22 AM    LDL, calculated  11/17/2020 08:22 AM Comment:      Triglyceride value is too high to calculate LDL. Sarmad Martinez Direct LDL is being  performed. VLDL, calculated 91 (H) 11/17/2020 08:22 AM    Triglyceride 453 (H) 11/17/2020 08:22 AM   trig down to 300's -stopped using creamer    Lab Results   Component Value Date/Time    Hemoglobin A1c 5.9 (H) 11/17/2020 08:22 AM   also 5.9 recently  [address]    Time of Arrival: 07/12/21 1026    (M10.9) Gouty arthropathy - Plan: DME: Crutches, DME: Post Op Shoe    Assessment/Differential Diagnosis:   80-year-old female with PMH as below here for right heel pain/swelling/erythema/warmth, no known bite or sting  Afebrile, elevated BP, VSS/normal otherwise, NAD  DDx: Gouty arthropathy, osteoarthritis, less likely cellulitis/septic joint/abscess    ED Course/Medical Decision Making:   Plain film of right ankle shows nothing acute    Sent home on tapering dose of Prednisone, prn Indocin  Currently with dec swelling, the bone protruberance is still there; heel pain is still there-sharp and occl tingling  No hx of trauma, no insect bite  Now able to walk on it    BP also elevated then at the ER  Home BPs-  Lab Results   Component Value Date/Time    Uric acid 8.4 (H) 07/29/2021 11:33 AM      ?Family hx of gout? Yes, dad    Wants to stop OCP-weight gain, ?elev BP    ROS:    Pt denies: Wt loss, Fever/Chills, HA, Visual changes, Fatigue, Chest pain, SOB, COELLO, Abd pain, N/V/D/C, Blood in stool or urine, Edema. Pertinent positive as above in HPI.  All others were negative    Patient Active Problem List   Diagnosis Code    Essential hypertension I10    Mixed hyperlipidemia E78.2    Impaired fasting blood sugar R73.01    Elevated LFTs R79.89    Class 1 obesity with serious comorbidity in adult E66.9    Insomnia G47.00    Thickened endometrium R93.89    Fatty liver K76.0       Past Medical History:   Diagnosis Date    Hypercholesterolemia     Hypertension        Current Outpatient Medications   Medication Sig Dispense Refill    lisinopril-hydroCHLOROthiazide (PRINZIDE, ZESTORETIC) 10-12.5 mg per tablet Take 1 Tablet by mouth daily. 90 Tablet 0    docosahexaenoic acid/epa (FISH OIL PO) Take 2,400 mg by mouth.  simvastatin (ZOCOR) 40 mg tablet Take 1 Tab by mouth nightly. 90 Tab 1    norethindrone-ethinyl estradiol (Junel FE 1/20, 28,) 1 mg-20 mcg (21)/75 mg (7) tab Junel FE 1/20 (28) 1 mg-20 mcg (21)/75 mg (7) tablet         Social History     Tobacco Use   Smoking Status Never Smoker   Smokeless Tobacco Never Used       No Known Allergies    Patient Labs were reviewed: yes    Patient Past Records were reviewed: yes      Objective:     Vitals:    07/29/21 1011 07/29/21 1026   BP: (!) 142/100 (!) 142/100   Pulse: 96 99   Weight: 140 lb (63.5 kg)    Height: 5' 2\" (1.575 m)      Body mass index is 25.61 kg/m². Exam:   Appearance: alert, well appearing,  oriented to person, place, and time, acyanotic, in no respiratory distress and well hydrated. HEENT:  NC/AT, pink conj, anicteric sclerae  Neck:  No cervical lymphadenopathy, no JVD, no thyromegaly, no carotid bruit  Heart:  RRR without M/R/G  Lungs:  CTAB, no rhonchi, rales, or wheezes with good air exchange   Abdomen:  Non-tender, pos bowel sounds, no hepatosplenomegaly  Ext:  No C/C/E, bony protuberance on the right Ant's, some fading redness around the heel area  Skin: no rash  Neuro: no lateralizing signs, CNs II-XII intact            I have discussed the diagnosis with the patient and the intended plan as seen in the above orders. The patient has received an After-Visit Summary and questions were answered concerning future plans. Medication Side Effects and Warnings were discussed with patient: yes    Patient verbalized understanding of above instructions.     Ervin Gutierres MD  Internal Medicine  [facility]

## 2021-07-29 NOTE — PATIENT INSTRUCTIONS
Purine-Restricted Diet: Care Instructions  Your Care Instructions     Purines are substances that are found in some foods. Your body turns purines into uric acid. High levels of uric acid can cause gout, which is a form of arthritis that causes pain and inflammation in joints. You may be able to help control the amount of uric acid in your body by limiting high-purine foods in your diet. Follow-up care is a key part of your treatment and safety. Be sure to make and go to all appointments, and call your doctor if you are having problems. It's also a good idea to know your test results and keep a list of the medicines you take. How can you care for yourself at home? · Plan your meals and snacks around foods that are low in purines and are safe for you to eat. These foods include:  ? Green vegetables and tomatoes. ? Fruits. ? Whole-grain breads, rice, and cereals. ? Eggs, peanut butter, and nuts. ? Low-fat milk, cheese, and other milk products. ? Popcorn. ? Gelatin desserts, chocolate, cocoa, and cakes and sweets, in small amounts. · You can eat certain foods that are medium-high in purines, but eat them only once in a while. These foods include:  ? Legumes, such as dried beans and dried peas. You can have 1 cup cooked legumes each day. ? Asparagus, cauliflower, spinach, mushrooms, and green peas. ? Fish and seafood (other than very high-purine seafood). ? Oatmeal, wheat bran, and wheat germ. · Limit very high-purine foods, including:  ? Organ meats, such as liver, kidneys, sweetbreads, and brains. ? Meats, including nichols, beef, pork, and lamb. ? Game meats and any other meats in large amounts. ? Anchovies, sardines, herring, mackerel, and scallops. ? Gravy. ? Beer. Where can you learn more? Go to http://www.gray.com/  Enter F448 in the search box to learn more about \"Purine-Restricted Diet: Care Instructions. \"  Current as of: December 17, 2020               Content Version: 12.8  © 6638-1627 [employer]. Care instructions adapted under license by [employer] (which disclaims liability or warranty for this information). If you have questions about a medical condition or this instruction, always ask your healthcare professional. [address] any warranty or liability for your use of this information. Plantar Fasciitis: Exercises  Introduction  Here are some examples of exercises for you to try. The exercises may be suggested for a condition or for rehabilitation. Start each exercise slowly. Ease off the exercises if you start to have pain. You will be told when to start these exercises and which ones will work best for you. How to do the exercises  Towel stretch   1. Sit with your legs extended and knees straight. 2. Place a towel around your foot just under the toes. 3. Hold each end of the towel in each hand, with your hands above your knees. 4. Pull back with the towel so that your foot stretches toward you. 5. Hold the position for at least 15 to 30 seconds. 6. Repeat 2 to 4 times a session, up to 5 sessions a day. Calf stretch   This exercise stretches the muscles at the back of the lower leg (the calf) and the Achilles tendon. Do this exercise 3 or 4 times a day, 5 days a week. 1. Stand facing a wall with your hands on the wall at about eye level. Put the leg you want to stretch about a step behind your other leg. 2. Keeping your back heel on the floor, bend your front knee until you feel a stretch in the back leg. 3. Hold the stretch for 15 to 30 seconds. Repeat 2 to 4 times. Plantar fascia and calf stretch   Stretching the plantar fascia and calf muscles can increase flexibility and decrease heel pain. You can do this exercise several times each day and before and after activity. 1. Stand on a step as shown above. Be sure to hold on to the banister.   2. Slowly let your heels down over the edge of the step as you relax your calf muscles. You should feel a gentle stretch across the bottom of your foot and up the back of your leg to your knee. 3. Hold the stretch about 15 to 30 seconds, and then tighten your calf muscle a little to bring your heel back up to the level of the step. Repeat 2 to 4 times. Towel curls   Make this exercise more challenging by placing a weighted object, such as a soup can, on the other end of the towel. 1. While sitting, place your foot on a towel on the floor and scrunch the towel toward you with your toes. 2. Then, also using your toes, push the towel away from you. [place] pickups   1. Put marbles on the floor next to a cup.  2. Using your toes, try to lift the marbles up from the floor and put them in the cup. Follow-up care is a key part of your treatment and safety. Be sure to make and go to all appointments, and call your doctor if you are having problems. It's also a good idea to know your test results and keep a list of the medicines you take. Where can you learn more? Go to http://www.gray.com/  Enter C078881 in the search box to learn more about \"Plantar Fasciitis: Exercises. \"  Current as of: November 16, 2020               Content Version: 12.8  © 5480-8624 [employer]. Care instructions adapted under license by [employer] (which disclaims liability or warranty for this information). If you have questions about a medical condition or this instruction, always ask your healthcare professional. [place] [address] any warranty or liability for your use of this information.

## 2021-08-03 NOTE — PROGRESS NOTES
Pls let patient know uric acid level is elevated so it is possible that gout caused the swelling of her ankle. Contiue to avoid foods that can cause gout.

## 2021-09-23 ENCOUNTER — OFFICE VISIT (OUTPATIENT)
Dept: FAMILY MEDICINE CLINIC | Age: 41
End: 2021-09-23
Payer: COMMERCIAL

## 2021-09-23 VITALS
DIASTOLIC BLOOD PRESSURE: 103 MMHG | TEMPERATURE: 98.6 F | BODY MASS INDEX: 25.54 KG/M2 | WEIGHT: 138.8 LBS | HEART RATE: 91 BPM | SYSTOLIC BLOOD PRESSURE: 150 MMHG | HEIGHT: 62 IN | RESPIRATION RATE: 16 BRPM

## 2021-09-23 DIAGNOSIS — D21.9 FIBROID: ICD-10-CM

## 2021-09-23 DIAGNOSIS — E79.0 HYPERURICEMIA: ICD-10-CM

## 2021-09-23 DIAGNOSIS — I10 ESSENTIAL HYPERTENSION: Primary | ICD-10-CM

## 2021-09-23 DIAGNOSIS — E78.1 HYPERTRIGLYCERIDEMIA: ICD-10-CM

## 2021-09-23 DIAGNOSIS — R73.01 IMPAIRED FASTING GLUCOSE: ICD-10-CM

## 2021-09-23 DIAGNOSIS — Z23 ENCOUNTER FOR IMMUNIZATION: ICD-10-CM

## 2021-09-23 PROCEDURE — 99214 OFFICE O/P EST MOD 30 MIN: CPT | Performed by: INTERNAL MEDICINE

## 2021-09-23 PROCEDURE — 90471 IMMUNIZATION ADMIN: CPT | Performed by: INTERNAL MEDICINE

## 2021-09-23 PROCEDURE — 90686 IIV4 VACC NO PRSV 0.5 ML IM: CPT | Performed by: INTERNAL MEDICINE

## 2021-09-23 RX ORDER — LISINOPRIL AND HYDROCHLOROTHIAZIDE 10; 12.5 MG/1; MG/1
1 TABLET ORAL DAILY
Qty: 90 TABLET | Refills: 1 | Status: CANCELLED | OUTPATIENT
Start: 2021-09-23

## 2021-09-23 RX ORDER — LISINOPRIL AND HYDROCHLOROTHIAZIDE 20; 25 MG/1; MG/1
1 TABLET ORAL DAILY
Qty: 90 TABLET | Refills: 1 | Status: SHIPPED | OUTPATIENT
Start: 2021-09-23 | End: 2021-10-25 | Stop reason: SDUPTHER

## 2021-09-23 RX ORDER — LISINOPRIL AND HYDROCHLOROTHIAZIDE 10; 12.5 MG/1; MG/1
1 TABLET ORAL DAILY
COMMUNITY
End: 2021-09-23 | Stop reason: DRUGHIGH

## 2021-09-23 NOTE — PATIENT INSTRUCTIONS
Purine-Restricted Diet: Care Instructions  Your Care Instructions     Purines are substances that are found in some foods. Your body turns purines into uric acid. High levels of uric acid can cause gout, which is a form of arthritis that causes pain and inflammation in joints. You may be able to help control the amount of uric acid in your body by limiting high-purine foods in your diet. Follow-up care is a key part of your treatment and safety. Be sure to make and go to all appointments, and call your doctor if you are having problems. It's also a good idea to know your test results and keep a list of the medicines you take. How can you care for yourself at home? · Plan your meals and snacks around foods that are low in purines and are safe for you to eat. These foods include:  ? Green vegetables and tomatoes. ? Fruits. ? Whole-grain breads, rice, and cereals. ? Eggs, peanut butter, and nuts. ? Low-fat milk, cheese, and other milk products. ? Popcorn. ? Gelatin desserts, chocolate, cocoa, and cakes and sweets, in small amounts. · You can eat certain foods that are medium-high in purines, but eat them only once in a while. These foods include:  ? Legumes, such as dried beans and dried peas. You can have 1 cup cooked legumes each day. ? Asparagus, cauliflower, spinach, mushrooms, and green peas. ? Fish and seafood (other than very high-purine seafood). ? Oatmeal, wheat bran, and wheat germ. · Limit very high-purine foods, including:  ? Organ meats, such as liver, kidneys, sweetbreads, and brains. ? Meats, including nichols, beef, pork, and lamb. ? Game meats and any other meats in large amounts. ? Anchovies, sardines, herring, mackerel, and scallops. ? Gravy. ? Beer. Where can you learn more? Go to http://www.gray.com/  Enter F448 in the search box to learn more about \"Purine-Restricted Diet: Care Instructions. \"  Current as of: December 17, 2020               Content Version: 13.0  © 2139-3469 [employer]. Care instructions adapted under license by [employer] (which disclaims liability or warranty for this information). If you have questions about a medical condition or this instruction, always ask your healthcare professional. [address] any warranty or liability for your use of this information.

## 2021-09-23 NOTE — PROGRESS NOTES
Patient seen for routine follow up with concerns of blood pressure. Patient stated she stopped Amlodipine d/t swelling in ankles, currently taking Lisinopril. 1. Have you been to the ER, urgent care clinic since your last visit? Hospitalized since your last visit? No    2. Have you seen or consulted any other health care providers outside of the [address] [place] [address] since your last visit? Include any pap smears or colon screening. Yes Podiatrist for pain and swelling in ankles. Health Maintenance Due   Topic Date Due    Cervical cancer screen  Never done     Patient was given VIS for review,consent was obtained and per orders of Dr. Elenita Burch, [facility] observed. No signs nor symptoms of any adverse reactions. Patient tolerated injection well.

## 2021-09-23 NOTE — PROGRESS NOTES
Assessment/ Plan:   Diagnoses and all orders for this visit:    1. Essential hypertension  -     CBC WITH AUTOMATED DIFF; Future  -     METABOLIC PANEL, COMPREHENSIVE; Future  -     lisinopril-hydroCHLOROthiazide (PRINZIDE, ZESTORETIC) 20-25 mg per tablet; Take 1 Tablet by mouth daily. 2. Hyperuricemia  -     URIC ACID; Future    3. Hypertriglyceridemia  -     LIPID PANEL; Future    4. Impaired fasting glucose  -     HEMOGLOBIN A1C WITH EAG; Future    5. Fibroid    6. Encounter for immunization  -     INFLUENZA VIRUS VAC QUAD,SPLIT,PRESV FREE SYRINGE IM  -     THER/PROPH/DIAG INJECTION, SUBCUT/IM        Follow-up and Dispositions    · Return in about 3 months (around 12/23/2021) for follow up. Chief Complaint   Patient presents with    Follow Up Chronic Condition     blood pressure       Pt is a 45y.o. year old female who presents for follow up of her chronic medical problems    Health Maintenance Due   Topic Date Due    Cervical cancer screen -c/o gyn Never done      Amlodipine-leg swelling    Saw Podiatry for both feet-bone spurs; given Prednisone again for 7 days, Aleve  Not gout    Lab Results   Component Value Date/Time    Uric acid 8.4 (H) 07/29/2021 11:33 AM   family hx of gout-dad   Changed her diet since    Currently on Lisinopril HCT-leg swelling  Home BPs-up and down    Wt Readings from Last 3 Encounters:   09/23/21 138 lb 12.8 oz (63 kg)   07/29/21 140 lb (63.5 kg)   06/03/21 138 lb 9.6 oz (62.9 kg)       On OCP-will try to get off; considering ablation for fibroids  BP better off of it in the past      ROS:    Pt denies: Wt loss, Fever/Chills, HA, Visual changes, Fatigue, Chest pain, SOB, COELLO, Abd pain, N/V/D/C, Blood in stool or urine, Edema. Pertinent positive as above in HPI.  All others were negative    Patient Active Problem List   Diagnosis Code    Essential hypertension I10    Mixed hyperlipidemia E78.2    Impaired fasting blood sugar R73.01    Elevated LFTs R79.89    Class 1 obesity with serious comorbidity in adult E66.9    Insomnia G47.00    Thickened endometrium R93.89    Fatty liver K76.0       Past Medical History:   Diagnosis Date    Hypercholesterolemia     Hypertension        Current Outpatient Medications   Medication Sig Dispense Refill    lisinopril-hydroCHLOROthiazide (PRINZIDE, ZESTORETIC) 10-12.5 mg per tablet Take 1 Tablet by mouth daily.  simvastatin (ZOCOR) 40 mg tablet Take 1 Tablet by mouth nightly. 90 Tablet 1    docosahexaenoic acid/epa (FISH OIL PO) Take 2,400 mg by mouth.  norethindrone-ethinyl estradiol (Junel FE 1/20, 28,) 1 mg-20 mcg (21)/75 mg (7) tab Junel FE 1/20 (28) 1 mg-20 mcg (21)/75 mg (7) tablet      amLODIPine (NORVASC) 5 mg tablet Take 1 Tablet by mouth daily. (Patient not taking: Reported on 9/23/2021) 90 Tablet 0       Social History     Tobacco Use   Smoking Status Never Smoker   Smokeless Tobacco Never Used       No Known Allergies    Patient Labs were reviewed: yes    Patient Past Records were reviewed: yes      Objective:     Vitals:    09/23/21 1000 09/23/21 1015   BP: (!) 152/100 (!) 150/103   Pulse: 95 91   Resp: 16    Temp: 98.6 °F (37 °C)    TempSrc: Temporal    Weight: 138 lb 12.8 oz (63 kg)    Height: 5' 2\" (1.575 m)      Body mass index is 25.39 kg/m². Exam:   Appearance: alert, well appearing,  oriented to person, place, and time, acyanotic, in no respiratory distress and well hydrated. HEENT:  NC/AT, pink conj, anicteric sclerae  Neck:  No cervical lymphadenopathy, no JVD, no thyromegaly, no carotid bruit  Heart:  RRR without M/R/G  Lungs:  CTAB, no rhonchi, rales, or wheezes with good air exchange   Abdomen:  Non-tender, pos bowel sounds, no hepatosplenomegaly  Ext:  No C/C/E    Skin: no rash  Neuro: no lateralizing signs, CNs II-XII intact            I have discussed the diagnosis with the patient and the intended plan as seen in the above orders.   The patient has received an After-Visit Summary and questions were answered concerning future plans. Medication Side Effects and Warnings were discussed with patient: yes    Patient verbalized understanding of above instructions.     Eamon Huynh MD  Internal Medicine  [facility]

## 2021-09-24 DIAGNOSIS — E78.2 MIXED HYPERLIPIDEMIA: ICD-10-CM

## 2021-09-24 DIAGNOSIS — R73.01 IMPAIRED FASTING BLOOD SUGAR: ICD-10-CM

## 2021-09-24 DIAGNOSIS — I10 ESSENTIAL HYPERTENSION: ICD-10-CM

## 2021-10-25 DIAGNOSIS — I10 ESSENTIAL HYPERTENSION: ICD-10-CM

## 2021-10-25 RX ORDER — LISINOPRIL AND HYDROCHLOROTHIAZIDE 20; 25 MG/1; MG/1
1 TABLET ORAL DAILY
Qty: 90 TABLET | Refills: 1 | Status: SHIPPED | OUTPATIENT
Start: 2021-10-25 | End: 2022-06-20 | Stop reason: SDUPTHER

## 2021-10-30 LAB
HBA1C MFR BLD HPLC: 6 %
LDL-C, EXTERNAL: 137

## 2021-11-02 DIAGNOSIS — E79.0 HYPERURICEMIA: Primary | ICD-10-CM

## 2021-11-02 RX ORDER — ALLOPURINOL 100 MG/1
100 TABLET ORAL DAILY
Qty: 90 TABLET | Refills: 1 | Status: SHIPPED | OUTPATIENT
Start: 2021-11-02 | End: 2022-06-27 | Stop reason: SDUPTHER

## 2021-11-04 ENCOUNTER — DOCUMENTATION ONLY (OUTPATIENT)
Dept: FAMILY MEDICINE CLINIC | Age: 41
End: 2021-11-04

## 2022-01-19 ENCOUNTER — OFFICE VISIT (OUTPATIENT)
Dept: FAMILY MEDICINE CLINIC | Age: 42
End: 2022-01-19
Payer: COMMERCIAL

## 2022-01-19 VITALS
RESPIRATION RATE: 16 BRPM | BODY MASS INDEX: 24.95 KG/M2 | DIASTOLIC BLOOD PRESSURE: 93 MMHG | TEMPERATURE: 98.8 F | SYSTOLIC BLOOD PRESSURE: 135 MMHG | OXYGEN SATURATION: 99 % | WEIGHT: 135.6 LBS | HEART RATE: 94 BPM | HEIGHT: 62 IN

## 2022-01-19 DIAGNOSIS — R73.01 IMPAIRED FASTING GLUCOSE: ICD-10-CM

## 2022-01-19 DIAGNOSIS — H66.90 ACUTE OTITIS MEDIA, UNSPECIFIED OTITIS MEDIA TYPE: Primary | ICD-10-CM

## 2022-01-19 DIAGNOSIS — E78.1 HYPERTRIGLYCERIDEMIA: ICD-10-CM

## 2022-01-19 DIAGNOSIS — I10 ESSENTIAL HYPERTENSION: ICD-10-CM

## 2022-01-19 DIAGNOSIS — E79.0 HYPERURICEMIA: ICD-10-CM

## 2022-01-19 PROCEDURE — 99214 OFFICE O/P EST MOD 30 MIN: CPT | Performed by: INTERNAL MEDICINE

## 2022-01-19 RX ORDER — AMOXICILLIN AND CLAVULANATE POTASSIUM 875; 125 MG/1; MG/1
1 TABLET, FILM COATED ORAL EVERY 12 HOURS
Qty: 14 TABLET | Refills: 0 | Status: SHIPPED | OUTPATIENT
Start: 2022-01-19 | End: 2022-01-26

## 2022-01-19 RX ORDER — METOPROLOL SUCCINATE 25 MG/1
25 TABLET, EXTENDED RELEASE ORAL DAILY
Qty: 30 TABLET | Refills: 3 | Status: SHIPPED | OUTPATIENT
Start: 2022-01-19

## 2022-01-19 NOTE — PROGRESS NOTES
Patient presents with ringing Left ear, pain and swelling around jaw for one week. Denies fever or warmth to touch    1. Have you been to the ER, urgent care clinic since your last visit? Hospitalized since your last visit? No    2. Have you seen or consulted any other health care providers outside of the [address] [place] [address] since your last visit? Include any pap smears or colon screening.  No     Health Maintenance Due   Topic Date Due    Cervical cancer screen  Never done    COVID-19 Vaccine (3 - Booster for Moderna series) 10/30/2021

## 2022-01-19 NOTE — PATIENT INSTRUCTIONS
Ear Infection (Otitis Media): Care Instructions  Overview     An ear infection may start with a cold and affect the middle ear (otitis media). It can hurt a lot. Most ear infections clear up on their own in a couple of days and do not need antibiotics. Also, antibiotics do not work against viruses, which may be the cause of your infection. Regular doses of pain relievers are the best way to reduce your fever and help you feel better. Follow-up care is a key part of your treatment and safety. Be sure to make and go to all appointments, and call your doctor if you are having problems. It's also a good idea to know your test results and keep a list of the medicines you take. How can you care for yourself at home? · Take pain medicines exactly as directed. ? If the doctor gave you a prescription medicine for pain, take it as prescribed. ? If you are not taking a prescription pain medicine, take an over-the-counter medicine, such as acetaminophen (Tylenol), ibuprofen (Advil, Motrin), or naproxen (Aleve). Read and follow all instructions on the label. ? Do not take two or more pain medicines at the same time unless the doctor told you to. Many pain medicines have acetaminophen, which is Tylenol. Too much acetaminophen (Tylenol) can be harmful. · Plan to take a full dose of pain reliever before bedtime. Getting enough sleep will help you get better. · Try a warm, moist washcloth on the ear. It may help relieve pain. · If your doctor prescribed antibiotics, take them as directed. Do not stop taking them just because you feel better. You need to take the full course of antibiotics. When should you call for help? Call your doctor now or seek immediate medical care if:    · You have new or increasing ear pain.     · You have new or increasing pus or blood draining from your ear.     · You have a fever with a stiff neck or a severe headache.    Watch closely for changes in your health, and be sure to contact your doctor if:    · You have new or worse symptoms.     · You are not getting better after taking an antibiotic for 2 days. Where can you learn more? Go to http://www.gray.com/  Enter [id number] in the search box to learn more about \"Ear Infection (Otitis Media): Care Instructions. \"  Current as of: December 2, 2020               Content Version: 13.0  © 4045-7378 [employer]. Care instructions adapted under license by [employer] (which disclaims liability or warranty for this information). If you have questions about a medical condition or this instruction, always ask your healthcare professional. [place] [address] any warranty or liability for your use of this information.

## 2022-01-19 NOTE — PROGRESS NOTES
Assessment/ Plan:   Diagnoses and all orders for this visit:    1. Acute otitis media, unspecified otitis media type  -     amoxicillin-clavulanate (AUGMENTIN) 875-125 mg per tablet; Take 1 Tablet by mouth every twelve (12) hours for 7 days. 2. Essential hypertension-uncontrolled, will add Toprol to Lisinopril HCT as HR is also on the high side and she had side effects prev on Norvasc  -     metoprolol succinate (TOPROL-XL) 25 mg XL tablet; Take 1 Tablet by mouth daily. 3. Impaired fasting glucose-continue to watch diet , repeat A1c next visit    4. Hypertriglyceridemia-dec carbs in diet; repeat fasting lipids next visit    5. Hyperuricemia-continue with Allopurinol and recheck uric acid next visit        Follow-up and Dispositions    · Return for previous appt. Chief Complaint   Patient presents with    Ear Pain     swelling left side    Hypertension     since sxs of ear pain       Pt is a 37y.o. year old female who presents for follow up of her chronic medical problems    Health Maintenance Due   Topic Date Due    Cervical cancer screen  Never done    COVID-19 Vaccine (3 - Booster for [facility] series) 10/30/2021     Yesterday, felt BP to be up-had some pain back of neck    Pain related? Before this, BP ok on Lisinopril HCT  Had swelling on the Norvasc    Friday last week had swelling on the left ear-got better but still with pain inside the ear  No fever     Labs from last visit:  Lab Results   Component Value Date/Time    Uric acid 8.4 (H) 07/29/2021 11:33 AM   on allopurinol    Lab Results   Component Value Date/Time    Cholesterol, total 247 (H) 11/17/2020 08:22 AM    HDL Cholesterol 44 11/17/2020 08:22 AM    LDL,Direct 164 (H) 11/17/2020 08:22 AM    LDL-C, External 137 10/30/2021 12:00 AM    LDL, calculated  11/17/2020 08:22 AM      Comment:      Triglyceride value is too high to calculate LDL. [facility] Direct LDL is being  performed.       VLDL, calculated 91 (H) 11/17/2020 08:22 AM Triglyceride 453 (H) 11/17/2020 08:22 AM   Trig better  On Zocor    Lab Results   Component Value Date/Time    Hemoglobin A1c 5.9 (H) 11/17/2020 08:22 AM    Hemoglobin A1c, External 6.0 10/30/2021 12:00 AM       ROS:    Pt denies: Wt loss, Fever/Chills, HA, Visual changes, Fatigue, Chest pain, SOB, COELLO, Abd pain, N/V/D/C, Blood in stool or urine, Edema. Pertinent positive as above in HPI. All others were negative    Patient Active Problem List   Diagnosis Code    Essential hypertension I10    Mixed hyperlipidemia E78.2    Impaired fasting blood sugar R73.01    Elevated LFTs R79.89    Class 1 obesity with serious comorbidity in adult E66.9    Insomnia G47.00    Thickened endometrium R93.89    Fatty liver K76.0       Past Medical History:   Diagnosis Date    Hypercholesterolemia     Hypertension        Current Outpatient Medications   Medication Sig Dispense Refill    allopurinoL (ZYLOPRIM) 100 mg tablet Take 1 Tablet by mouth daily. 90 Tablet 1    lisinopril-hydroCHLOROthiazide (PRINZIDE, ZESTORETIC) 20-25 mg per tablet Take 1 Tablet by mouth daily. 90 Tablet 1    simvastatin (ZOCOR) 40 mg tablet Take 1 Tablet by mouth nightly. 90 Tablet 1    docosahexaenoic acid/epa (FISH OIL PO) Take 2,400 mg by mouth.  norethindrone-ethinyl estradiol (Junel FE 1/20, 28,) 1 mg-20 mcg (21)/75 mg (7) tab Junel FE 1/20 (28) 1 mg-20 mcg (21)/75 mg (7) tablet         Social History     Tobacco Use   Smoking Status Never Smoker   Smokeless Tobacco Never Used       Allergies   Allergen Reactions    Lipitor [Atorvastatin] Other (comments)     cramping       Patient Labs were reviewed: yes    Patient Past Records were reviewed: yes      Objective:     Vitals:    01/19/22 1101 01/19/22 1124   BP: (!) 159/106 (!) 135/93   Pulse: 100 94   Resp: 16    Temp: 98.8 °F (37.1 °C)    TempSrc: Temporal    SpO2: 99%    Weight: 135 lb 9.6 oz (61.5 kg)    Height: 5' 2\" (1.575 m)      Body mass index is 24.8 kg/m².     Exam: Appearance: alert, well appearing,  oriented to person, place, and time, acyanotic, in no respiratory distress and well hydrated. HEENT:  NC/AT, pink conj, anicteric sclerae, both TMs appear dull and bulging but not red; worse on the left  Neck:  No cervical lymphadenopathy, no JVD, no thyromegaly, no carotid bruit  Heart:  RRR without M/R/G  Lungs:  CTAB, no rhonchi, rales, or wheezes with good air exchange   Abdomen:  Non-tender, pos bowel sounds, no hepatosplenomegaly  Ext:  No C/C/E    Skin: no rash  Neuro: no lateralizing signs, CNs II-XII intact            I have discussed the diagnosis with the patient and the intended plan as seen in the above orders. The patient has received an After-Visit Summary and questions were answered concerning future plans. Medication Side Effects and Warnings were discussed with patient: yes    Patient verbalized understanding of above instructions.     Eduin Kenyon MD  Internal Medicine  [facility]

## 2022-02-04 DIAGNOSIS — E78.2 MIXED HYPERLIPIDEMIA: ICD-10-CM

## 2022-02-06 RX ORDER — SIMVASTATIN 40 MG/1
40 TABLET, FILM COATED ORAL
Qty: 90 TABLET | Refills: 1 | Status: SHIPPED | OUTPATIENT
Start: 2022-02-06 | End: 2022-08-31 | Stop reason: SDUPTHER

## 2022-02-09 DIAGNOSIS — R73.01 IMPAIRED FASTING GLUCOSE: ICD-10-CM

## 2022-02-09 DIAGNOSIS — E78.1 HYPERTRIGLYCERIDEMIA: ICD-10-CM

## 2022-02-09 DIAGNOSIS — E79.0 HYPERURICEMIA: ICD-10-CM

## 2022-02-09 DIAGNOSIS — I10 ESSENTIAL HYPERTENSION: ICD-10-CM

## 2022-03-18 PROBLEM — R93.89 THICKENED ENDOMETRIUM: Status: ACTIVE | Noted: 2020-10-20

## 2022-03-18 PROBLEM — R79.89 ELEVATED LFTS: Status: ACTIVE | Noted: 2020-10-20

## 2022-03-18 PROBLEM — I10 ESSENTIAL HYPERTENSION: Status: ACTIVE | Noted: 2020-10-20

## 2022-03-19 PROBLEM — R73.01 IMPAIRED FASTING BLOOD SUGAR: Status: ACTIVE | Noted: 2020-10-20

## 2022-03-19 PROBLEM — E78.2 MIXED HYPERLIPIDEMIA: Status: ACTIVE | Noted: 2020-10-20

## 2022-03-19 PROBLEM — K76.0 FATTY LIVER: Status: ACTIVE | Noted: 2020-10-20

## 2022-03-19 PROBLEM — E66.9 CLASS 1 OBESITY WITH SERIOUS COMORBIDITY IN ADULT: Status: ACTIVE | Noted: 2020-10-20

## 2022-03-19 PROBLEM — G47.00 INSOMNIA: Status: ACTIVE | Noted: 2020-10-20

## 2022-03-19 PROBLEM — E66.811 CLASS 1 OBESITY WITH SERIOUS COMORBIDITY IN ADULT: Status: ACTIVE | Noted: 2020-10-20

## 2022-05-12 ENCOUNTER — OFFICE VISIT (OUTPATIENT)
Dept: FAMILY MEDICINE CLINIC | Age: 42
End: 2022-05-12
Payer: COMMERCIAL

## 2022-05-12 ENCOUNTER — HOSPITAL ENCOUNTER (OUTPATIENT)
Dept: LAB | Age: 42
Discharge: HOME | End: 2022-05-12
Payer: COMMERCIAL

## 2022-05-12 VITALS
RESPIRATION RATE: 16 BRPM | HEIGHT: 62 IN | BODY MASS INDEX: 25.76 KG/M2 | SYSTOLIC BLOOD PRESSURE: 131 MMHG | OXYGEN SATURATION: 99 % | HEART RATE: 78 BPM | DIASTOLIC BLOOD PRESSURE: 87 MMHG | TEMPERATURE: 98.4 F | WEIGHT: 140 LBS

## 2022-05-12 DIAGNOSIS — E78.2 MIXED HYPERLIPIDEMIA: ICD-10-CM

## 2022-05-12 DIAGNOSIS — I10 ESSENTIAL HYPERTENSION: ICD-10-CM

## 2022-05-12 DIAGNOSIS — R73.01 IMPAIRED FASTING GLUCOSE: ICD-10-CM

## 2022-05-12 DIAGNOSIS — E79.0 HYPERURICEMIA: ICD-10-CM

## 2022-05-12 DIAGNOSIS — H92.02 ACUTE EAR PAIN, LEFT: Primary | ICD-10-CM

## 2022-05-12 LAB
ALBUMIN SERPL-MCNC: 4.4 G/DL (ref 3.4–5)
ALBUMIN/GLOB SERPL: 1.2 {RATIO} (ref 0.8–1.7)
ALP SERPL-CCNC: 72 U/L (ref 45–117)
ALT SERPL-CCNC: 66 U/L (ref 13–56)
ANION GAP SERPL CALC-SCNC: 8 MMOL/L (ref 3–18)
AST SERPL-CCNC: 48 U/L (ref 10–38)
BASOPHILS # BLD: 0.1 K/UL (ref 0–0.1)
BASOPHILS NFR BLD: 1 % (ref 0–2)
BILIRUB SERPL-MCNC: 0.7 MG/DL (ref 0.2–1)
BUN SERPL-MCNC: 10 MG/DL (ref 7–18)
BUN/CREAT SERPL: 18 (ref 12–20)
CALCIUM SERPL-MCNC: 9.9 MG/DL (ref 8.5–10.1)
CHLORIDE SERPL-SCNC: 102 MMOL/L (ref 100–111)
CHOLEST SERPL-MCNC: 220 MG/DL
CO2 SERPL-SCNC: 28 MMOL/L (ref 21–32)
CREAT SERPL-MCNC: 0.55 MG/DL (ref 0.6–1.3)
DIFFERENTIAL METHOD BLD: ABNORMAL
EOSINOPHIL # BLD: 0.2 K/UL (ref 0–0.4)
EOSINOPHIL NFR BLD: 2 % (ref 0–5)
ERYTHROCYTE [DISTWIDTH] IN BLOOD BY AUTOMATED COUNT: 12.8 % (ref 11.6–14.5)
EST. AVERAGE GLUCOSE BLD GHB EST-MCNC: 126 MG/DL
GLOBULIN SER CALC-MCNC: 3.7 G/DL (ref 2–4)
GLUCOSE SERPL-MCNC: 87 MG/DL (ref 74–99)
HBA1C MFR BLD: 6 % (ref 4.2–5.6)
HCT VFR BLD AUTO: 40.9 % (ref 35–45)
HDLC SERPL-MCNC: 48 MG/DL (ref 40–60)
HDLC SERPL: 4.6 {RATIO} (ref 0–5)
HGB BLD-MCNC: 13.8 G/DL (ref 12–16)
IMM GRANULOCYTES # BLD AUTO: 0 K/UL (ref 0–0.04)
IMM GRANULOCYTES NFR BLD AUTO: 0 % (ref 0–0.5)
LDLC SERPL CALC-MCNC: 109.2 MG/DL (ref 0–100)
LIPID PROFILE,FLP: ABNORMAL
LYMPHOCYTES # BLD: 4.1 K/UL (ref 0.9–3.6)
LYMPHOCYTES NFR BLD: 43 % (ref 21–52)
MCH RBC QN AUTO: 29.9 PG (ref 24–34)
MCHC RBC AUTO-ENTMCNC: 33.7 G/DL (ref 31–37)
MCV RBC AUTO: 88.5 FL (ref 78–100)
MONOCYTES # BLD: 0.7 K/UL (ref 0.05–1.2)
MONOCYTES NFR BLD: 8 % (ref 3–10)
NEUTS SEG # BLD: 4.3 K/UL (ref 1.8–8)
NEUTS SEG NFR BLD: 46 % (ref 40–73)
NRBC # BLD: 0 K/UL (ref 0–0.01)
NRBC BLD-RTO: 0 PER 100 WBC
PLATELET # BLD AUTO: 343 K/UL (ref 135–420)
PMV BLD AUTO: 10.2 FL (ref 9.2–11.8)
POTASSIUM SERPL-SCNC: 3.8 MMOL/L (ref 3.5–5.5)
PROT SERPL-MCNC: 8.1 G/DL (ref 6.4–8.2)
RBC # BLD AUTO: 4.62 M/UL (ref 4.2–5.3)
SODIUM SERPL-SCNC: 138 MMOL/L (ref 136–145)
TRIGL SERPL-MCNC: 314 MG/DL (ref ?–150)
URATE SERPL-MCNC: 6.9 MG/DL (ref 2.6–7.2)
VLDLC SERPL CALC-MCNC: 62.8 MG/DL
WBC # BLD AUTO: 9.4 K/UL (ref 4.6–13.2)

## 2022-05-12 PROCEDURE — 36415 COLL VENOUS BLD VENIPUNCTURE: CPT

## 2022-05-12 PROCEDURE — 80061 LIPID PANEL: CPT

## 2022-05-12 PROCEDURE — 83036 HEMOGLOBIN GLYCOSYLATED A1C: CPT

## 2022-05-12 PROCEDURE — 85025 COMPLETE CBC W/AUTO DIFF WBC: CPT

## 2022-05-12 PROCEDURE — 99214 OFFICE O/P EST MOD 30 MIN: CPT | Performed by: INTERNAL MEDICINE

## 2022-05-12 PROCEDURE — 84550 ASSAY OF BLOOD/URIC ACID: CPT

## 2022-05-12 PROCEDURE — 80053 COMPREHEN METABOLIC PANEL: CPT

## 2022-05-12 RX ORDER — AMOXICILLIN AND CLAVULANATE POTASSIUM 875; 125 MG/1; MG/1
1 TABLET, FILM COATED ORAL EVERY 12 HOURS
Qty: 14 TABLET | Refills: 0 | Status: SHIPPED | OUTPATIENT
Start: 2022-05-12 | End: 2022-05-19

## 2022-05-12 NOTE — PROGRESS NOTES
Patient presents with ringing left ear with pain and facial swelling along jaw x four days. Denies sinus pressure    Health Maintenance Due   Topic Date Due    COVID-19 Vaccine (3 - Booster for Moderna series) 09/30/2021     1. \"Have you been to the ER, urgent care clinic since your last visit? Hospitalized since your last visit? \" No    2. \"Have you seen or consulted any other health care providers outside of the [address] [place] [address] since your last visit? \" No     3. For patients aged 39-70: Has the patient had a colonoscopy / FIT/ Cologuard? NA - based on age      If the patient is female:    4. For patients aged 41-77: Has the patient had a mammogram within the past 2 years? NA - based on age or sex      11. For patients aged 21-65: Has the patient had a pap smear?  Yes - no Care Gap present

## 2022-05-12 NOTE — PATIENT INSTRUCTIONS
Eustachian Tube Problems: Care Instructions  Overview     The eustachian (say \"you-STAY-shee-un\") tubes run between the inside of the ears and the throat. They keep air pressure stable in the ears. If your eustachian tubes become blocked, the air pressure in your ears changes. The fluids from a cold can clog eustachian tubes, causing pain in the ears. A quick change in air pressure can cause eustachian tubes to close up. This might happen when an airplane changes altitude or when a  goes up or down underwater. Eustachian tube problems often clear up on their own or after treating the cause of the blockage. If your tubes continue to be blocked, you may need surgery. Follow-up care is a key part of your treatment and safety. Be sure to make and go to all appointments, and call your doctor if you are having problems. It's also a good idea to know your test results and keep a list of the medicines you take. How can you care for yourself at home? · Try a simple exercise to help open blocked tubes. Close your mouth, hold your nose, and gently blow as if you are blowing your nose. Yawning and chewing gum also may help. You may hear or feel a \"pop\" when the tubes open. · To ease ear pain, apply a warm washcloth or a heating pad set on low. There may be some drainage from the ear when the heat melts earwax. Put a cloth between the heat source and your skin. · If your doctor prescribed antibiotics, take them as directed. Do not stop taking them just because you feel better. You need to take the full course of antibiotics. · Be safe with medicines. Depending on the cause of the problem, your doctor may recommend over-the-counter medicine. For example, adults may try decongestants for cold symptoms or nasal spray steroids for allergies. Follow the instructions carefully. · Be careful with cough and cold medicines.  Don't give them to children younger than 6, because they don't work for children that age and can even be harmful. For children 6 and older, always follow all the instructions carefully. Make sure you know how much medicine to give and how long to use it. And use the dosing device if one is included. When should you call for help? Call your doctor now or seek immediate medical care if:    · You develop sudden, complete hearing loss.     · You have severe pain or feel dizzy.     · You have new or increasing pus or blood draining from your ear.     · You have redness, swelling, or pain around or behind the ear. Watch closely for changes in your health, and be sure to contact your doctor if:    · You do not get better after 2 weeks.     · You have any new symptoms, such as itching or a feeling of fullness in the ear. Where can you learn more? Go to http://www.gray.com/  Enter Y822 in the search box to learn more about \"Eustachian Tube Problems: Care Instructions. \"  Current as of: September 8, 2021               Content Version: 13.2  © 2006-2022 "[employer]. Care instructions adapted under license by [employer] (which disclaims liability or warranty for this information). If you have questions about a medical condition or this instruction, always ask your healthcare professional. [address] any warranty or liability for your use of this information.

## 2022-06-09 ENCOUNTER — E-VISIT (OUTPATIENT)
Dept: FAMILY MEDICINE CLINIC | Age: 42
End: 2022-06-09
Payer: COMMERCIAL

## 2022-06-09 DIAGNOSIS — N39.0 URINARY TRACT INFECTION WITHOUT HEMATURIA, SITE UNSPECIFIED: Primary | ICD-10-CM

## 2022-06-09 PROCEDURE — 99421 OL DIG E/M SVC 5-10 MIN: CPT | Performed by: INTERNAL MEDICINE

## 2022-06-09 RX ORDER — NITROFURANTOIN 25; 75 MG/1; MG/1
100 CAPSULE ORAL 2 TIMES DAILY
Qty: 10 CAPSULE | Refills: 0 | Status: SHIPPED | OUTPATIENT
Start: 2022-06-09 | End: 2022-06-14

## 2022-06-09 NOTE — PROGRESS NOTES
Garrett Colmenares (1980) initiated an asynchronous digital communication through [employer]. HPI: per patient questionnaire     Exam: not applicable    Diagnoses and all orders for this visit:  Diagnoses and all orders for this visit:    1. Urinary tract infection without hematuria, site unspecified  -     nitrofurantoin, macrocrystal-monohydrate, (MACROBID) 100 mg capsule; Take 1 Capsule by mouth two (2) times a day for 5 days. Cannot be on Bactrim bec of possible inc K with the Lisinopril she is on    Time: EV1 - 5-10 minutes were spent on the digital evaluation and management of this patient.       Jazz Valente MD

## 2022-06-20 DIAGNOSIS — I10 ESSENTIAL HYPERTENSION: ICD-10-CM

## 2022-06-20 RX ORDER — LISINOPRIL AND HYDROCHLOROTHIAZIDE 20; 25 MG/1; MG/1
1 TABLET ORAL DAILY
Qty: 90 TABLET | Refills: 1 | Status: SHIPPED | OUTPATIENT
Start: 2022-06-20 | End: 2022-06-27 | Stop reason: SDUPTHER

## 2022-06-27 DIAGNOSIS — I10 ESSENTIAL HYPERTENSION: ICD-10-CM

## 2022-06-27 DIAGNOSIS — E79.0 HYPERURICEMIA: ICD-10-CM

## 2022-06-28 RX ORDER — LISINOPRIL AND HYDROCHLOROTHIAZIDE 20; 25 MG/1; MG/1
1 TABLET ORAL DAILY
Qty: 90 TABLET | Refills: 1 | Status: SHIPPED | OUTPATIENT
Start: 2022-06-28 | End: 2022-08-22 | Stop reason: DRUGHIGH

## 2022-06-28 RX ORDER — BETAMETHASONE VALERATE 0.1 %
LOTION (ML) TOPICAL 2 TIMES DAILY
Qty: 60 ML | Refills: 0 | Status: SHIPPED | OUTPATIENT
Start: 2022-06-28

## 2022-06-28 RX ORDER — HYDROCORTISONE 25 MG/G
CREAM TOPICAL 2 TIMES DAILY
Qty: 30 G | Refills: 0 | Status: SHIPPED | OUTPATIENT
Start: 2022-06-28

## 2022-06-28 RX ORDER — ALLOPURINOL 100 MG/1
100 TABLET ORAL DAILY
Qty: 90 TABLET | Refills: 1 | Status: SHIPPED | OUTPATIENT
Start: 2022-06-28

## 2022-08-22 ENCOUNTER — OFFICE VISIT (OUTPATIENT)
Dept: FAMILY MEDICINE CLINIC | Age: 42
End: 2022-08-22
Payer: COMMERCIAL

## 2022-08-22 VITALS
OXYGEN SATURATION: 100 % | HEART RATE: 69 BPM | RESPIRATION RATE: 16 BRPM | DIASTOLIC BLOOD PRESSURE: 84 MMHG | SYSTOLIC BLOOD PRESSURE: 132 MMHG | BODY MASS INDEX: 25.8 KG/M2 | HEIGHT: 62 IN | TEMPERATURE: 98.3 F | WEIGHT: 140.2 LBS

## 2022-08-22 DIAGNOSIS — M10.9 GOUT, UNSPECIFIED CAUSE, UNSPECIFIED CHRONICITY, UNSPECIFIED SITE: ICD-10-CM

## 2022-08-22 DIAGNOSIS — R61 HYPERHIDROSIS: ICD-10-CM

## 2022-08-22 DIAGNOSIS — R73.01 IMPAIRED FASTING GLUCOSE: ICD-10-CM

## 2022-08-22 DIAGNOSIS — H93.12 TINNITUS OF LEFT EAR: ICD-10-CM

## 2022-08-22 DIAGNOSIS — Z12.31 ENCOUNTER FOR SCREENING MAMMOGRAM FOR MALIGNANT NEOPLASM OF BREAST: ICD-10-CM

## 2022-08-22 DIAGNOSIS — R79.89 ELEVATED LFTS: ICD-10-CM

## 2022-08-22 DIAGNOSIS — I10 ESSENTIAL HYPERTENSION: Primary | ICD-10-CM

## 2022-08-22 DIAGNOSIS — E78.2 MIXED HYPERLIPIDEMIA: ICD-10-CM

## 2022-08-22 DIAGNOSIS — D21.9 FIBROID: ICD-10-CM

## 2022-08-22 PROCEDURE — 99214 OFFICE O/P EST MOD 30 MIN: CPT | Performed by: INTERNAL MEDICINE

## 2022-08-22 RX ORDER — OXYBUTYNIN CHLORIDE 10 MG/1
10 TABLET, EXTENDED RELEASE ORAL
COMMUNITY
Start: 2022-08-02

## 2022-08-22 RX ORDER — LISINOPRIL 10 MG/1
10 TABLET ORAL DAILY
Qty: 90 TABLET | Refills: 1 | Status: SHIPPED | OUTPATIENT
Start: 2022-08-22

## 2022-08-22 NOTE — PROGRESS NOTES
Patient seen for routine follow up without concerns. Health Maintenance Due   Topic Date Due    COVID-19 Vaccine (3 - Booster for Moderna series) 09/30/2021     1. \"Have you been to the ER, urgent care clinic since your last visit? Hospitalized since your last visit? \" No    2. \"Have you seen or consulted any other health care providers outside of the [address] [place] [address] since your last visit? \" Yes Dermatology       3. For patients aged 39-70: Has the patient had a colonoscopy / FIT/ Cologuard? NA - based on age      If the patient is female:    4. For patients aged 41-77: Has the patient had a mammogram within the past 2 years? Yes - no Care Gap present      5. For patients aged 21-65: Has the patient had a pap smear?  Yes - no Care Gap present

## 2022-08-22 NOTE — PROGRESS NOTES
Assessment/ Plan:   Diagnoses and all orders for this visit:    1. Essential hypertension-BP lower now that she is off OCP; aware of possible teratogenic side effects of Lisinopril but wants to stay on this as she had leg swelling on Norvasc  -     lisinopriL (PRINIVIL, ZESTRIL) 10 mg tablet; Take 1 Tablet by mouth daily. 2. Elevated LFTs-advised on weight loss, avoidance of alcohol and Tylenol; may stop the statin for a few months and recheck next visit    3. Mixed hyperlipidemia-low cholesterol diet advised  and will determine ASCVD risk with next labs to see if she will benefit from taking a statin     4. Gout, unspecified cause, unspecified chronicity, unspecified site-low purine diet advised, goal uric acid is less than 6; HCTZ stopped as this could also precipitate gout    5. Impaired fasting glucose-continue with efforts at weight loss    6. Tinnitus of left ear-likely ETD, advised to try Flonase, allergy meds    7. Lucascristobal Shawanda is for her to have ablation    8. Hyperhidrosis-Derm following, Oxybutynin started (reassured this does not cause elev uric acid)    9. Encounter for screening mammogram for malignant neoplasm of breast-can do Q 2 yrs until she turns 49 when she can do Q yr  -     [facility] MAMMO BI SCREENING INCL CAD; Future      Follow-up and Dispositions    Return in about 3 months (around 11/22/2022) for follow up.                      Chief Complaint   Patient presents with    Follow Up Chronic Condition       Pt is a 37y.o. year old female who presents for follow up of her chronic medical problems    Health Maintenance Due   Topic Date Due    COVID-19 Vaccine (3 - Booster for Moderna series) 09/30/2021      BP Readings from Last 3 Encounters:   08/22/22 132/84   05/12/22 131/87   01/19/22 (!) 135/93    Off OCP  On Lisinopril/HCT  Home BPs lower      Wt Readings from Last 3 Encounters:   08/22/22 140 lb 3.2 oz (63.6 kg)   05/12/22 140 lb (63.5 kg)   01/19/22 135 lb 9.6 oz (61.5 kg)        Lab Results   Component Value Date/Time    Cholesterol, total 220 (H) 05/12/2022 12:32 PM    HDL Cholesterol 48 05/12/2022 12:32 PM    LDL,Direct 164 (H) 11/17/2020 08:22 AM    LDL-C, External 137 10/30/2021 12:00 AM    LDL, calculated 109.2 (H) 05/12/2022 12:32 PM    VLDL, calculated 62.8 05/12/2022 12:32 PM    Triglyceride 314 (H) 05/12/2022 12:32 PM    CHOL/HDL Ratio 4.6 05/12/2022 12:32 PM   On Zocor      Lab Results   Component Value Date/Time    Uric acid 6.9 05/12/2022 12:32 PM    On Allopurinol daily    Breast cancer-paternal sister and paternal cousins  Mammo at [facility]    Oxybutynin for hyperhidrosis     ROS:    Pt denies: Wt loss, Fever/Chills, HA, Visual changes, Fatigue, Chest pain, SOB, COELLO, Abd pain, N/V/D/C, Blood in stool or urine, Edema. Pertinent positive as above in HPI. All others were negative    Patient Active Problem List   Diagnosis Code    Essential hypertension I10    Mixed hyperlipidemia E78.2    Impaired fasting blood sugar R73.01    Elevated LFTs R79.89    Class 1 obesity with serious comorbidity in adult E66.9    Insomnia G47.00    Thickened endometrium R93.89    Fatty liver K76.0       Past Medical History:   Diagnosis Date    Hypercholesterolemia     Hypertension        Current Outpatient Medications   Medication Sig Dispense Refill    oxybutynin chloride XL (DITROPAN XL) 10 mg CR tablet Take 10 mg by mouth nightly. Lisinopril HCT       allopurinoL (ZYLOPRIM) 100 mg tablet Take 1 Tablet by mouth daily. 90 Tablet 1    betamethasone valerate (VALISONE) 0.1 % topical lotion Apply  to affected area two (2) times a day. 60 mL 0    hydrocortisone (HYTONE) 2.5 % topical cream Apply  to affected area two (2) times a day. use thin layer 30 g 0    simvastatin (ZOCOR) 40 mg tablet Take 1 Tablet by mouth nightly. 90 Tablet 1    metoprolol succinate (TOPROL-XL) 25 mg XL tablet Take 1 Tablet by mouth daily. 30 Tablet 3    docosahexaenoic acid/epa (FISH OIL PO) Take 2,400 mg by mouth.          Social History     Tobacco Use   Smoking Status Never   Smokeless Tobacco Never       Allergies   Allergen Reactions    Lipitor [Atorvastatin] Other (comments)     cramping       Patient Labs were reviewed: yes    Patient Past Records were reviewed: yes      Objective:     Vitals:    08/22/22 1328 08/22/22 1344   BP: (!) 159/99 132/84   Pulse: 95 69   Resp: 16    Temp: 98.3 °F (36.8 °C)    TempSrc: Temporal    SpO2: 100%    Weight: 140 lb 3.2 oz (63.6 kg)    Height: 5' 2\" (1.575 m)      Body mass index is 25.64 kg/m². Exam:   Appearance: alert, well appearing,  oriented to person, place, and time, acyanotic, in no respiratory distress and well hydrated. HEENT:  NC/AT, pink conj, anicteric sclerae  Neck:  No cervical lymphadenopathy, no JVD, no thyromegaly, no carotid bruit  Heart:  RRR without M/R/G  Lungs:  CTAB, no rhonchi, rales, or wheezes with good air exchange   Abdomen:  Non-tender, pos bowel sounds, no hepatosplenomegaly  Ext:  No C/C/E    Skin: no rash  Neuro: no lateralizing signs, CNs II-XII intact            I have discussed the diagnosis with the patient and the intended plan as seen in the above orders. The patient has received an After-Visit Summary and questions were answered concerning future plans. Medication Side Effects and Warnings were discussed with patient: yes    Patient verbalized understanding of above instructions.     Cristina Chacon MD  Internal Medicine  [facility]

## 2022-08-31 DIAGNOSIS — E78.2 MIXED HYPERLIPIDEMIA: ICD-10-CM

## 2022-08-31 RX ORDER — SIMVASTATIN 40 MG/1
40 TABLET, FILM COATED ORAL
Qty: 90 TABLET | Refills: 1 | Status: SHIPPED | OUTPATIENT
Start: 2022-08-31

## 2022-11-22 ENCOUNTER — OFFICE VISIT (OUTPATIENT)
Dept: FAMILY MEDICINE CLINIC | Age: 42
End: 2022-11-22
Payer: COMMERCIAL

## 2022-11-22 VITALS — WEIGHT: 140 LBS | BODY MASS INDEX: 25.76 KG/M2 | HEIGHT: 62 IN

## 2022-11-22 DIAGNOSIS — Z00.00 WELL WOMAN EXAM (NO GYNECOLOGICAL EXAM): Primary | ICD-10-CM

## 2022-11-22 DIAGNOSIS — R22.0 JAW SWELLING: ICD-10-CM

## 2022-11-22 DIAGNOSIS — E78.2 MIXED HYPERLIPIDEMIA: ICD-10-CM

## 2022-11-22 DIAGNOSIS — Z23 NEEDS FLU SHOT: ICD-10-CM

## 2022-11-22 DIAGNOSIS — Z12.31 ENCOUNTER FOR SCREENING MAMMOGRAM FOR MALIGNANT NEOPLASM OF BREAST: ICD-10-CM

## 2022-11-22 DIAGNOSIS — R73.01 IMPAIRED FASTING GLUCOSE: ICD-10-CM

## 2022-11-22 DIAGNOSIS — H69.82 ETD (EUSTACHIAN TUBE DYSFUNCTION), LEFT: ICD-10-CM

## 2022-11-22 DIAGNOSIS — I10 ESSENTIAL HYPERTENSION: ICD-10-CM

## 2022-11-22 DIAGNOSIS — M10.9 GOUT, UNSPECIFIED CAUSE, UNSPECIFIED CHRONICITY, UNSPECIFIED SITE: ICD-10-CM

## 2022-11-22 PROCEDURE — 90471 IMMUNIZATION ADMIN: CPT | Performed by: INTERNAL MEDICINE

## 2022-11-22 PROCEDURE — 99396 PREV VISIT EST AGE 40-64: CPT | Performed by: INTERNAL MEDICINE

## 2022-11-22 PROCEDURE — 90686 IIV4 VACC NO PRSV 0.5 ML IM: CPT | Performed by: INTERNAL MEDICINE

## 2022-11-22 NOTE — PROGRESS NOTES
1. \"Have you been to the ER, urgent care clinic since your last visit? Hospitalized since your last visit? \" No    2. \"Have you seen or consulted any other health care providers outside of the [address] [place] [address] since your last visit? \" Yes Saw Dermatologist for a follow up      3. For patients aged 39-70: Has the patient had a colonoscopy / FIT/ Cologuard? NA - based on age      If the patient is female:    4. For patients aged 41-77: Has the patient had a mammogram within the past 2 years? No      5. For patients aged 21-65: Has the patient had a pap smear? Yes - no Care Gap present      Patient was given VIS for review, consent was obtained and per orders of Dr. Ambrose Varner, injection of Flulaval given by Kyra Corcoran LPN. Patient observed. No signs nor symptoms of any adverse reactions. Patient tolerated injection well.

## 2022-11-22 NOTE — PROGRESS NOTES
Assessment/ Plan:   Diagnoses and all orders for this visit:    1. Well woman exam (no gynecological exam)-Advised re: monthly self breast exam, dental prophylaxis Q 6 months, regular exercise, yearly eye exam, daily intake of Ca+D   -     CBC WITH AUTOMATED DIFF; Future  -     METABOLIC PANEL, COMPREHENSIVE; Future  -     LIPID PANEL; Future    2. Essential hypertension-advised to avoid Lisinopril as she is of childbearing age and is not on contraception; had side effects on Norvasc; cannot take HCTZ bec of gout  Will try Metoprolol    3. Mixed hyperlipidemia-goal LDL of less than 100 on Zocor    4. Needs flu shot  -     INFLUENZA, FLUARIX, FLULAVAL, FLUZONE (AGE 6 MO+), AFLURIA(AGE 3Y+) IM, PF, 0.5 ML    5. ETD (Eustachian tube dysfunction), left-persistent  -     REFERRAL TO ENT-OTOLARYNGOLOGY    6. Jaw swelling-?parotitis  -     REFERRAL TO ENT-OTOLARYNGOLOGY    7. Encounter for screening mammogram for malignant neoplasm of breast  -     HUANG MAMMO BI SCREENING INCL CAD; Future    8. Gout, unspecified cause, unspecified chronicity, unspecified site-goal uric acid of less than 6 on daily Allopurinol  -     URIC ACID; Future    9. Impaired fasting glucose-continue to watch diet  -     HEMOGLOBIN A1C WITH EAG; Future      Follow-up and Dispositions    Return in about 3 months (around 2/22/2023) for follow up.                      Chief Complaint   Patient presents with    Follow Up Chronic Condition     3 month       Pt is a 37y.o. year old female who presents for follow up of her chronic medical problems/annual wellness visit    Health Maintenance Due   Topic Date Due    COVID-19 Vaccine (3 - Booster for Moderna series) 06/25/2021        Wt Readings from Last 3 Encounters:   11/22/22 140 lb (63.5 kg)   08/22/22 140 lb 3.2 oz (63.6 kg)   05/12/22 140 lb (63.5 kg)        BP Readings from Last 3 Encounters:   11/22/22 (P) 120/77   08/22/22 132/84   05/12/22 131/87    Taking Lisinopril HCT instead of just Lisinopril bec she did not feel well  Off Toprol; only prn    Amlodipine-had swelling    Hx of gout-cannot do HCTZ  Childbearing      ROS:    Pt denies: Wt loss, Fever/Chills, HA, Visual changes, Fatigue, Chest pain, SOB, COELLO, Abd pain, N/V/D/C, Blood in stool or urine, Edema. Pertinent positive as above in HPI. All others were negative    Patient Active Problem List   Diagnosis Code    Essential hypertension I10    Mixed hyperlipidemia E78.2    Impaired fasting blood sugar R73.01    Elevated LFTs R79.89    Class 1 obesity with serious comorbidity in adult E66.9    Insomnia G47.00    Thickened endometrium R93.89    Fatty liver K76.0       Past Medical History:   Diagnosis Date    Hypercholesterolemia     Hypertension        Current Outpatient Medications   Medication Sig Dispense Refill    simvastatin (ZOCOR) 40 mg tablet Take 1 Tablet by mouth nightly. 90 Tablet 1    oxybutynin chloride XL (DITROPAN XL) 10 mg CR tablet Take 10 mg by mouth nightly. lisinopriL (PRINIVIL, ZESTRIL) 10 mg tablet Take 1 Tablet by mouth daily. 90 Tablet 1    allopurinoL (ZYLOPRIM) 100 mg tablet Take 1 Tablet by mouth daily. 90 Tablet 1    betamethasone valerate (VALISONE) 0.1 % topical lotion Apply  to affected area two (2) times a day. 60 mL 0    hydrocortisone (HYTONE) 2.5 % topical cream Apply  to affected area two (2) times a day. use thin layer 30 g 0    metoprolol succinate (TOPROL-XL) 25 mg XL tablet Take 1 Tablet by mouth daily. (Patient taking differently: Take 25 mg by mouth daily. As needed) 30 Tablet 3    docosahexaenoic acid/epa (FISH OIL PO) Take 2,400 mg by mouth.          Social History     Tobacco Use   Smoking Status Never   Smokeless Tobacco Never       Allergies   Allergen Reactions    Lipitor [Atorvastatin] Other (comments)     cramping       Patient Labs were reviewed: yes    Patient Past Records were reviewed: yes      Objective:     Vitals:    11/22/22 1339   BP: (P) 120/77   Pulse: (P) 90   Resp: (P) 14   Temp: (P) 98.3 °F (36.8 °C)   TempSrc: (P) Temporal   SpO2: (P) 99%   Weight: 140 lb (63.5 kg)   Height: 5' 2\" (1.575 m)     Body mass index is 25.61 kg/m². Exam:   Appearance: alert, well appearing,  oriented to person, place, and time, acyanotic, in no respiratory distress and well hydrated. HEENT:  NC/AT, pink conj, anicteric sclerae  Neck:  No cervical lymphadenopathy, no JVD, no thyromegaly, no carotid bruit  Heart:  RRR without M/R/G  Lungs:  CTAB, no rhonchi, rales, or wheezes with good air exchange   Abdomen:  Non-tender, pos bowel sounds, no hepatosplenomegaly  Ext:  No C/C/E    Skin: no rash  Neuro: no lateralizing signs, CNs II-XII intact            I have discussed the diagnosis with the patient and the intended plan as seen in the above orders. The patient has received an After-Visit Summary and questions were answered concerning future plans. Medication Side Effects and Warnings were discussed with patient: yes    Patient verbalized understanding of above instructions.     Darline Mosqueda MD  Internal Medicine  [facility]

## 2022-11-22 NOTE — PATIENT INSTRUCTIONS
Vaccine Information Statement    Influenza (Flu) Vaccine (Inactivated or Recombinant): What You Need to Know    Many vaccine information statements are available in [place] and other languages. See www.immunize.org/vis. Hojas de información sobre vacunas están disponibles en español y en muchos otros idiomas. Visite www.immunize.org/vis. 1. Why get vaccinated? Influenza vaccine can prevent influenza (flu). Flu is a contagious disease that spreads around the [facility] every year, usually between October and May. Anyone can get the flu, but it is more dangerous for some people. Infants and young children, people 72 years and older, pregnant people, and people with certain health conditions or a weakened immune system are at greatest risk of flu complications. Pneumonia, bronchitis, sinus infections, and ear infections are examples of flu-related complications. If you have a medical condition, such as heart disease, cancer, or diabetes, flu can make it worse. Flu can cause fever and chills, sore throat, muscle aches, fatigue, cough, headache, and runny or stuffy nose. Some people may have vomiting and diarrhea, though this is more common in children than adults. In an average year, thousands of people in the [facility] die from flu, and many more are hospitalized. Flu vaccine prevents millions of illnesses and flu-related visits to the doctor each year. 2. Influenza vaccines     CDC recommends everyone 6 months and older get vaccinated every flu season. Children 6 months through 6years of age may need 2 doses during a single flu season. Everyone else needs only 1 dose each flu season. It takes about 2 weeks for protection to develop after vaccination. There are many flu viruses, and they are always changing. Each year a new flu vaccine is made to protect against the influenza viruses believed to be likely to cause disease in the upcoming flu season.  Even when the vaccine doesnt exactly match these viruses, it may still provide some protection. Influenza vaccine does not cause flu. Influenza vaccine may be given at the same time as other vaccines. 3. Talk with your health care provider    Tell your vaccination provider if the person getting the vaccine:  Has had an allergic reaction after a previous dose of influenza vaccine, or has any severe, life-threatening allergies   Has ever had Guillain-Barré Syndrome (also called GBS)    In some cases, your health care provider may decide to postpone influenza vaccination until a future visit. Influenza vaccine can be administered at any time during pregnancy. People who are or will be pregnant during influenza season should receive inactivated influenza vaccine. People with minor illnesses, such as a cold, may be vaccinated. People who are moderately or severely ill should usually wait until they recover before getting influenza vaccine. Your health care provider can give you more information. 4. Risks of a vaccine reaction    Soreness, redness, and swelling where the shot is given, fever, muscle aches, and headache can happen after influenza vaccination. There may be a very small increased risk of Guillain-Barré Syndrome (GBS) after inactivated influenza vaccine (the flu shot). Mavis Maria children who get the flu shot along with pneumococcal vaccine (PCV13) and/or DTaP vaccine at the same time might be slightly more likely to have a seizure caused by fever. Tell your health care provider if a child who is getting flu vaccine has ever had a seizure. People sometimes faint after medical procedures, including vaccination. Tell your provider if you feel dizzy or have vision changes or ringing in the ears. As with any medicine, there is a very remote chance of a vaccine causing a severe allergic reaction, other serious injury, or death. 5. What if there is a serious problem?     An allergic reaction could occur after the vaccinated person leaves the clinic. If you see signs of a severe allergic reaction (hives, swelling of the face and throat, difficulty breathing, a fast heartbeat, dizziness, or weakness), call 9-1-1 and get the person to the nearest hospital.    For other signs that concern you, call your health care provider. Adverse reactions should be reported to the Vaccine Adverse Event Reporting System (VAERS). Your health care provider will usually file this report, or you can do it yourself. Visit the VAERS website at www.vaers. [facility].gov or call 5-109.481.5110. VAERS is only for reporting reactions, and VAERS staff members do not give medical advice. 6. The National Vaccine Injury Compensation Program    The [facility] Vaccine Injury Compensation Program (VICP) is a federal program that was created to compensate people who may have been injured by certain vaccines. Claims regarding alleged injury or death due to vaccination have a time limit for filing, which may be as short as two years. Visit the VICP website at www.[facility].gov/vaccinecompensation or call 1-479.292.7454 to learn about the program and about filing a claim. 7. How can I learn more? Ask your health care provider. Call your local or state health department. Visit the website of the Food and Drug Administration (FDA) for vaccine package inserts and additional information at www.fda.gov/vaccines-blood-biologics/vaccines. Contact the Centers for Disease Control and Prevention (CDC): Call 9-778.913.1251 (3-978-EKI-INFO) or  Visit CDCs influenza website at www.cdc.gov/flu. Vaccine Information Statement   Inactivated Influenza Vaccine   8/6/2021  42 CHIO Berry 528AH-37   Department of Health and Human Services  Centers for Disease Control and Prevention    Office Use Only

## 2023-01-12 DIAGNOSIS — E79.0 HYPERURICEMIA: ICD-10-CM

## 2023-01-12 RX ORDER — ALLOPURINOL 100 MG/1
100 TABLET ORAL DAILY
Qty: 90 TABLET | Refills: 1 | Status: SHIPPED | OUTPATIENT
Start: 2023-01-12

## 2023-03-16 RX ORDER — SIMVASTATIN 40 MG
40 TABLET ORAL NIGHTLY
Qty: 90 TABLET | Refills: 1 | Status: SHIPPED | OUTPATIENT
Start: 2023-03-16

## 2023-03-20 RX ORDER — SIMVASTATIN 40 MG
40 TABLET ORAL NIGHTLY
Qty: 90 TABLET | Refills: 1 | OUTPATIENT
Start: 2023-03-20

## 2023-03-22 ENCOUNTER — OFFICE VISIT (OUTPATIENT)
Facility: CLINIC | Age: 43
End: 2023-03-22

## 2023-03-22 VITALS
SYSTOLIC BLOOD PRESSURE: 168 MMHG | TEMPERATURE: 98.4 F | WEIGHT: 141 LBS | RESPIRATION RATE: 16 BRPM | BODY MASS INDEX: 25.95 KG/M2 | HEIGHT: 62 IN | HEART RATE: 97 BPM | DIASTOLIC BLOOD PRESSURE: 100 MMHG

## 2023-03-22 DIAGNOSIS — Z11.4 SCREENING FOR HUMAN IMMUNODEFICIENCY VIRUS: ICD-10-CM

## 2023-03-22 DIAGNOSIS — Z11.59 NEED FOR HEPATITIS C SCREENING TEST: ICD-10-CM

## 2023-03-22 DIAGNOSIS — R73.01 IMPAIRED FASTING GLUCOSE: ICD-10-CM

## 2023-03-22 DIAGNOSIS — J02.0 ACUTE STREPTOCOCCAL PHARYNGITIS: Primary | ICD-10-CM

## 2023-03-22 DIAGNOSIS — M1A.9XX0 CHRONIC GOUT WITHOUT TOPHUS, UNSPECIFIED CAUSE, UNSPECIFIED SITE: ICD-10-CM

## 2023-03-22 DIAGNOSIS — E78.2 MIXED HYPERLIPIDEMIA: ICD-10-CM

## 2023-03-22 DIAGNOSIS — I10 ESSENTIAL HYPERTENSION: ICD-10-CM

## 2023-03-22 PROCEDURE — 3077F SYST BP >= 140 MM HG: CPT | Performed by: INTERNAL MEDICINE

## 2023-03-22 PROCEDURE — 3080F DIAST BP >= 90 MM HG: CPT | Performed by: INTERNAL MEDICINE

## 2023-03-22 PROCEDURE — 99214 OFFICE O/P EST MOD 30 MIN: CPT | Performed by: INTERNAL MEDICINE

## 2023-03-22 RX ORDER — CEPHALEXIN 500 MG/1
500 CAPSULE ORAL 2 TIMES DAILY
Qty: 20 CAPSULE | Refills: 0 | Status: SHIPPED | OUTPATIENT
Start: 2023-03-22 | End: 2023-04-01

## 2023-03-22 RX ORDER — LISINOPRIL AND HYDROCHLOROTHIAZIDE 20; 12.5 MG/1; MG/1
1 TABLET ORAL DAILY
Qty: 90 TABLET | Refills: 1 | Status: SHIPPED | OUTPATIENT
Start: 2023-03-22

## 2023-03-22 NOTE — PROGRESS NOTES
1. \"Have you been to the ER, urgent care clinic since your last visit? Hospitalized since your last visit? \" No    2. \"Have you seen or consulted any other health care providers outside of the [address] [place] [address] since your last visit? \" Yes ENT January 2022    3. For patients aged 39-70: Has the patient had a colonoscopy / FIT/ Cologuard? N/A      If the patient is female:    4. For patients aged 41-77: Has the patient had a mammogram within the past 2 years? No    5. For patients aged 21-65: Has the patient had a pap smear?  Yes - no Care Gap present    Health Maintenance Due   Topic Date Due    HIV screen  Never done    Hepatitis C screen  Never done    Varicella vaccine (2 of 2 - 13+ 2-dose series) 09/29/2004    COVID-19 Vaccine (3 - Booster for Moderna series) 06/25/2021    DTaP/Tdap/Td vaccine (3 - Td or Tdap) 12/01/2022
Atorvastatin Other (See Comments)     cramping       Patient Labs were reviewed: yes    Patient Past Records were reviewed: yes      Objective:     Vitals:    03/22/23 1341   BP: (!) 168/100   Pulse: 97   Resp: 16   Temp: 98.4 °F (36.9 °C)   TempSrc: Temporal   Weight: 141 lb (64 kg)   Height: 5' 2\" (1.575 m)     Body mass index is 25.79 kg/m². Exam:   Appearance: alert, well appearing,  oriented to person, place, and time, acyanotic, in no respiratory distress and well hydrated. HEENT:  NC/AT, pink conj, anicteric sclerae, tonsillopharyngeal congestion and left TM dull and full noted  Neck:  No cervical lymphadenopathy, no JVD, no thyromegaly, no carotid bruit  Heart:  RRR without M/R/G  Lungs:  CTAB, no rhonchi, rales, or wheezes with good air exchange   Abdomen:  Non-tender, pos bowel sounds, no hepatosplenomegaly  Ext:  No C/C/E    Skin: no rash  Neuro: no lateralizing signs, CNs II-XII intact    Rapid strep positive          I have discussed the diagnosis with the patient and the intended plan as seen in the above orders. The patient has received an After-Visit Summary and questions were answered concerning future plans. Medication Side Effects and Warnings were discussed with patient: yes    Patient verbalized understanding of above instructions.     Leanne Butcher MD  Internal Medicine  [facility]

## 2023-05-27 LAB
A/G RATIO: 1.5 RATIO (ref 1.1–2.6)
ALBUMIN SERPL-MCNC: 4.8 G/DL (ref 3.5–5)
ALP BLD-CCNC: 59 U/L (ref 25–115)
ALT SERPL-CCNC: 25 U/L (ref 5–40)
ANION GAP SERPL CALCULATED.3IONS-SCNC: 10 MMOL/L (ref 3–15)
AST SERPL-CCNC: 26 U/L (ref 10–37)
AVERAGE GLUCOSE: 128 MG/DL (ref 91–123)
BILIRUB SERPL-MCNC: 0.6 MG/DL (ref 0.2–1.2)
BUN BLDV-MCNC: 10 MG/DL (ref 6–22)
CALCIUM SERPL-MCNC: 9.6 MG/DL (ref 8.4–10.5)
CHLORIDE BLD-SCNC: 101 MMOL/L (ref 98–110)
CHOLESTEROL/HDL RATIO: 4.5 (ref 0–5)
CHOLESTEROL: 186 MG/DL (ref 110–200)
CO2: 26 MMOL/L (ref 20–32)
CREAT SERPL-MCNC: 0.5 MG/DL (ref 0.5–1.2)
GLOBULIN: 3.3 G/DL (ref 2–4)
GLOMERULAR FILTRATION RATE: >60 ML/MIN/1.73 SQ.M.
GLUCOSE: 88 MG/DL (ref 70–99)
HBA1C MFR BLD: 6.1 % (ref 4.8–5.6)
HCT VFR BLD CALC: 41.1 % (ref 35.1–46.5)
HDLC SERPL-MCNC: 41 MG/DL
HEMOGLOBIN: 13.7 G/DL (ref 11.7–15.5)
HEPATITIS C ANTIBODY: NORMAL
HIV -1/0/2 AG/AB WITH REFLEX: NON REACTIVE
HIV INTERPRETATION: NORMAL
LDL CHOLESTEROL CALCULATED: 75 MG/DL (ref 50–99)
LDL/HDL RATIO: 1.8
MCH RBC QN AUTO: 30 PG (ref 26–34)
MCHC RBC AUTO-ENTMCNC: 33 G/DL (ref 31–36)
MCV RBC AUTO: 89 FL (ref 80–99)
NON-HDL CHOLESTEROL: 145 MG/DL
PDW BLD-RTO: 13.2 % (ref 10–15.5)
PLATELET # BLD: 347 K/UL (ref 140–440)
PMV BLD AUTO: 10.3 FL (ref 9–13)
POTASSIUM SERPL-SCNC: 4 MMOL/L (ref 3.5–5.5)
RBC: 4.6 M/UL (ref 3.8–5.2)
SODIUM BLD-SCNC: 137 MMOL/L (ref 133–145)
TOTAL PROTEIN: 8.1 G/DL (ref 6.4–8.3)
TRIGL SERPL-MCNC: 349 MG/DL (ref 40–149)
URIC ACID: 6.4 MG/DL (ref 2.2–7.7)
VLDLC SERPL CALC-MCNC: 70 MG/DL (ref 8–30)
WBC: 8.1 K/UL (ref 4–11)

## 2023-05-31 ENCOUNTER — OFFICE VISIT (OUTPATIENT)
Facility: CLINIC | Age: 43
End: 2023-05-31
Payer: COMMERCIAL

## 2023-05-31 VITALS
WEIGHT: 142 LBS | SYSTOLIC BLOOD PRESSURE: 137 MMHG | DIASTOLIC BLOOD PRESSURE: 89 MMHG | RESPIRATION RATE: 14 BRPM | BODY MASS INDEX: 26.13 KG/M2 | HEIGHT: 62 IN | TEMPERATURE: 98.4 F | HEART RATE: 77 BPM | OXYGEN SATURATION: 99 %

## 2023-05-31 DIAGNOSIS — I10 ESSENTIAL HYPERTENSION: Primary | ICD-10-CM

## 2023-05-31 DIAGNOSIS — R73.03 PREDIABETES: ICD-10-CM

## 2023-05-31 DIAGNOSIS — M1A.9XX0 CHRONIC GOUT WITHOUT TOPHUS, UNSPECIFIED CAUSE, UNSPECIFIED SITE: ICD-10-CM

## 2023-05-31 DIAGNOSIS — E78.2 MIXED HYPERLIPIDEMIA: ICD-10-CM

## 2023-05-31 PROCEDURE — 3079F DIAST BP 80-89 MM HG: CPT | Performed by: INTERNAL MEDICINE

## 2023-05-31 PROCEDURE — 99214 OFFICE O/P EST MOD 30 MIN: CPT | Performed by: INTERNAL MEDICINE

## 2023-05-31 PROCEDURE — 3075F SYST BP GE 130 - 139MM HG: CPT | Performed by: INTERNAL MEDICINE

## 2023-05-31 SDOH — ECONOMIC STABILITY: FOOD INSECURITY: WITHIN THE PAST 12 MONTHS, YOU WORRIED THAT YOUR FOOD WOULD RUN OUT BEFORE YOU GOT MONEY TO BUY MORE.: NEVER TRUE

## 2023-05-31 SDOH — ECONOMIC STABILITY: INCOME INSECURITY: HOW HARD IS IT FOR YOU TO PAY FOR THE VERY BASICS LIKE FOOD, HOUSING, MEDICAL CARE, AND HEATING?: NOT VERY HARD

## 2023-05-31 SDOH — ECONOMIC STABILITY: FOOD INSECURITY: WITHIN THE PAST 12 MONTHS, THE FOOD YOU BOUGHT JUST DIDN'T LAST AND YOU DIDN'T HAVE MONEY TO GET MORE.: NEVER TRUE

## 2023-05-31 SDOH — ECONOMIC STABILITY: HOUSING INSECURITY
IN THE LAST 12 MONTHS, WAS THERE A TIME WHEN YOU DID NOT HAVE A STEADY PLACE TO SLEEP OR SLEPT IN A SHELTER (INCLUDING NOW)?: NO

## 2023-05-31 ASSESSMENT — PATIENT HEALTH QUESTIONNAIRE - PHQ9
SUM OF ALL RESPONSES TO PHQ9 QUESTIONS 1 & 2: 0
1. LITTLE INTEREST OR PLEASURE IN DOING THINGS: 0
SUM OF ALL RESPONSES TO PHQ QUESTIONS 1-9: 0
2. FEELING DOWN, DEPRESSED OR HOPELESS: 0
SUM OF ALL RESPONSES TO PHQ QUESTIONS 1-9: 0

## 2023-05-31 NOTE — PROGRESS NOTES
Assessment/ Plan:   Leonard Stovall was seen today for follow-up chronic condition. Diagnoses and all orders for this visit:    Essential hypertension-controlled, continue with Lisinopril HCT and Metoprolol; patient aware that Lisinopril can be teratogenic but says this is the only med that controls her BP    Mixed hyperlipidemia-goal LDL less than 100; advised to dec carbs in diet to get trig down    Chronic gout without tophus, unspecified cause, unspecified site-continue with daily Allopurinol and low purine diet    Elev LFTs resolved    Prediabetes-continue to watch diet  Hemoglobin A1C   Date Value Ref Range Status   05/26/2023 6.1 (H) 4.8 - 5.6 % Final            Follow-up and Dispositions    Return in about 6 months (around 11/30/2023) for follow up. Chief Complaint   Patient presents with    Follow-up Chronic Condition     3 month       Pt is a 40y.o. year old female who presents for follow up of her chronic medical problems    Health Maintenance Due   Topic Date Due    Varicella vaccine (2 of 2 - 13+ 2-dose series) 09/29/2004    COVID-19 Vaccine (3 - Booster for Moderna series) 06/25/2021    DTaP/Tdap/Td vaccine (3 - Td or Tdap) 12/01/2022      Wt Readings from Last 3 Encounters:   05/31/23 142 lb (64.4 kg)   03/22/23 141 lb (64 kg)   11/22/22 140 lb (63.5 kg)        BP Readings from Last 3 Encounters:   05/31/23 137/89   03/22/23 (!) 168/100   11/22/22 120/77    118/85 at home this morning  Repeat BP better    Lab Results   Component Value Date/Time    CHOL 186 05/26/2023 09:54 AM    CHOL 220 05/12/2022 12:32 PM    TRIG 349 05/26/2023 09:54 AM    HDL 41 05/26/2023 09:54 AM    LDLCALC 75 05/26/2023 09:54 AM      Lab Results   Component Value Date    LABURIC 6.9 05/12/2022    URICACID 6.4 05/26/2023    ?  Recent gout-no flares but just pain; no swelling    LFTs back to normal      ROS:    Pt denies: Wt loss, Fever/Chills, HA, Visual changes, Fatigue, Chest pain, SOB, HAYES, Abd pain, N/V/D/C,

## 2023-05-31 NOTE — PROGRESS NOTES
1. \"Have you been to the ER, urgent care clinic since your last visit? Hospitalized since your last visit? \" No    2. \"Have you seen or consulted any other health care providers outside of the [address] [place] [address] since your last visit? \" No    3. For patients aged 39-70: Has the patient had a colonoscopy / FIT/ Cologuard? N/A      If the patient is female:    4. For patients aged 41-77: Has the patient had a mammogram within the past 2 years? Yes - Care Gap present. Most recent result on file    5. For patients aged 21-65: Has the patient had a pap smear?  Yes - no Care Gap present    Health Maintenance Due   Topic Date Due    Varicella vaccine (2 of 2 - 13+ 2-dose series) 09/29/2004    COVID-19 Vaccine (3 - Booster for Moderna series) 06/25/2021    DTaP/Tdap/Td vaccine (3 - Td or Tdap) 12/01/2022

## 2023-07-19 ENCOUNTER — OFFICE VISIT (OUTPATIENT)
Facility: CLINIC | Age: 43
End: 2023-07-19
Payer: COMMERCIAL

## 2023-07-19 VITALS
SYSTOLIC BLOOD PRESSURE: 132 MMHG | RESPIRATION RATE: 16 BRPM | HEIGHT: 62 IN | TEMPERATURE: 98.6 F | DIASTOLIC BLOOD PRESSURE: 89 MMHG | BODY MASS INDEX: 26.31 KG/M2 | WEIGHT: 143 LBS | OXYGEN SATURATION: 98 % | HEART RATE: 74 BPM

## 2023-07-19 DIAGNOSIS — R61 HYPERHIDROSIS: ICD-10-CM

## 2023-07-19 DIAGNOSIS — I10 ESSENTIAL HYPERTENSION: ICD-10-CM

## 2023-07-19 DIAGNOSIS — H66.003 NON-RECURRENT ACUTE SUPPURATIVE OTITIS MEDIA OF BOTH EARS WITHOUT SPONTANEOUS RUPTURE OF TYMPANIC MEMBRANES: Primary | ICD-10-CM

## 2023-07-19 PROCEDURE — 99214 OFFICE O/P EST MOD 30 MIN: CPT | Performed by: INTERNAL MEDICINE

## 2023-07-19 PROCEDURE — 3078F DIAST BP <80 MM HG: CPT | Performed by: INTERNAL MEDICINE

## 2023-07-19 PROCEDURE — 3074F SYST BP LT 130 MM HG: CPT | Performed by: INTERNAL MEDICINE

## 2023-07-19 RX ORDER — AMOXICILLIN AND CLAVULANATE POTASSIUM 875; 125 MG/1; MG/1
1 TABLET, FILM COATED ORAL 2 TIMES DAILY
Qty: 14 TABLET | Refills: 0 | Status: SHIPPED | OUTPATIENT
Start: 2023-07-19 | End: 2023-07-26

## 2023-07-19 RX ORDER — OXYBUTYNIN CHLORIDE 10 MG/1
10 TABLET, EXTENDED RELEASE ORAL DAILY
Qty: 90 TABLET | Refills: 1 | Status: SHIPPED | OUTPATIENT
Start: 2023-07-19

## 2023-07-19 NOTE — PROGRESS NOTES
Assessment/ Plan:   Aleida Landis was seen today for otalgia. Diagnoses and all orders for this visit:    Non-recurrent acute suppurative otitis media of both ears without spontaneous rupture of tympanic membranes  -     amoxicillin-clavulanate (AUGMENTIN) 875-125 MG per tablet; Take 1 tablet by mouth 2 times daily for 7 days    Essential hypertension-patient is aware of possible teratogenicity with ACEI    Hyperhidrosis-prev saw Derm for this and would like for me to continue with rx  -     oxybutynin (DITROPAN-XL) 10 MG extended release tablet; Take 1 tablet by mouth daily        Follow-up and Dispositions    Return for previous appt. Chief Complaint   Patient presents with    Otalgia     Bilateral  pressure; started Friday       Pt is a 40y.o. year old female who presents for follow up of her chronic medical problems/acute concern    Health Maintenance Due   Topic Date Due    Varicella vaccine (2 of 2 - 13+ 2-dose series) 09/29/2004    COVID-19 Vaccine (3 - Booster for Moderna series) 06/25/2021    DTaP/Tdap/Td vaccine (3 - Td or Tdap) 12/01/2022      Current  sxs started about a week ago-progressively getting worse congestion with bilat ear pressure    BP Readings from Last 3 Encounters:   07/19/23 132/89   05/31/23 137/89   03/22/23 (!) 168/100    Taking ACEI and prn Toprol  Uses condoms; not on OCP bec of side effects    Severe sweating-saw derm, needs refill of meds  ROS:    Pt denies: Wt loss, Fever/Chills, HA, Visual changes, Fatigue, Chest pain, SOB, HAYES, Abd pain, N/V/D/C, Blood in stool or urine, Edema. Pertinent positive as above in HPI.  All others were negative    Patient Active Problem List   Diagnosis    Elevated LFTs    Thickened endometrium    Essential hypertension    Fatty liver    Insomnia    Impaired fasting blood sugar    Class 1 obesity with serious comorbidity in adult    Mixed hyperlipidemia    Chronic gout without tophus       Past Medical History:   Diagnosis Date

## 2023-07-19 NOTE — PROGRESS NOTES
1. \"Have you been to the ER, urgent care clinic since your last visit? Hospitalized since your last visit? \" No    2. \"Have you seen or consulted any other health care providers outside of the [address] [place] [address] since your last visit? \" No    3. For patients aged 43-73: Has the patient had a colonoscopy / FIT/ Cologuard? N/A      If the patient is female:    4. For patients aged 43-66: Has the patient had a mammogram within the past 2 years? No- Patient is due    5. For patients aged 21-65: Has the patient had a pap smear?  Yes - no Care Gap present    Health Maintenance Due   Topic Date Due    Varicella vaccine (2 of 2 - 13+ 2-dose series) 09/29/2004    COVID-19 Vaccine (3 - Booster for Moderna series) 06/25/2021    DTaP/Tdap/Td vaccine (3 - Td or Tdap) 12/01/2022

## 2023-08-02 RX ORDER — ALLOPURINOL 100 MG/1
100 TABLET ORAL DAILY
Qty: 90 TABLET | Refills: 1 | Status: SHIPPED | OUTPATIENT
Start: 2023-08-02

## 2023-08-29 DIAGNOSIS — Z11.59 NEED FOR HEPATITIS C SCREENING TEST: ICD-10-CM

## 2023-08-29 DIAGNOSIS — I10 ESSENTIAL HYPERTENSION: ICD-10-CM

## 2023-08-29 DIAGNOSIS — E78.2 MIXED HYPERLIPIDEMIA: ICD-10-CM

## 2023-08-29 DIAGNOSIS — Z11.4 SCREENING FOR HUMAN IMMUNODEFICIENCY VIRUS: ICD-10-CM

## 2023-08-29 DIAGNOSIS — M1A.9XX0 CHRONIC GOUT WITHOUT TOPHUS, UNSPECIFIED CAUSE, UNSPECIFIED SITE: ICD-10-CM

## 2023-08-29 DIAGNOSIS — R73.01 IMPAIRED FASTING GLUCOSE: ICD-10-CM

## 2023-11-16 DIAGNOSIS — I10 ESSENTIAL HYPERTENSION: ICD-10-CM

## 2023-11-16 RX ORDER — LISINOPRIL AND HYDROCHLOROTHIAZIDE 20; 12.5 MG/1; MG/1
1 TABLET ORAL DAILY
Qty: 90 TABLET | Refills: 1 | Status: SHIPPED | OUTPATIENT
Start: 2023-11-16

## 2023-11-16 RX ORDER — SIMVASTATIN 40 MG
40 TABLET ORAL NIGHTLY
Qty: 90 TABLET | Refills: 1 | Status: SHIPPED | OUTPATIENT
Start: 2023-11-16

## 2023-11-30 ENCOUNTER — OFFICE VISIT (OUTPATIENT)
Facility: CLINIC | Age: 43
End: 2023-11-30
Payer: COMMERCIAL

## 2023-11-30 VITALS
SYSTOLIC BLOOD PRESSURE: 121 MMHG | WEIGHT: 141 LBS | BODY MASS INDEX: 25.95 KG/M2 | OXYGEN SATURATION: 98 % | HEIGHT: 62 IN | DIASTOLIC BLOOD PRESSURE: 80 MMHG | HEART RATE: 91 BPM | RESPIRATION RATE: 14 BRPM | TEMPERATURE: 98.8 F

## 2023-11-30 DIAGNOSIS — R00.2 PALPITATIONS: ICD-10-CM

## 2023-11-30 DIAGNOSIS — I10 ESSENTIAL HYPERTENSION: ICD-10-CM

## 2023-11-30 DIAGNOSIS — K21.9 GASTROESOPHAGEAL REFLUX DISEASE, UNSPECIFIED WHETHER ESOPHAGITIS PRESENT: ICD-10-CM

## 2023-11-30 DIAGNOSIS — R61 HYPERHIDROSIS: ICD-10-CM

## 2023-11-30 DIAGNOSIS — Z12.31 ENCOUNTER FOR SCREENING MAMMOGRAM FOR MALIGNANT NEOPLASM OF BREAST: ICD-10-CM

## 2023-11-30 DIAGNOSIS — Z00.00 WELL WOMAN EXAM (NO GYNECOLOGICAL EXAM): Primary | ICD-10-CM

## 2023-11-30 DIAGNOSIS — M1A.9XX0 CHRONIC GOUT WITHOUT TOPHUS, UNSPECIFIED CAUSE, UNSPECIFIED SITE: ICD-10-CM

## 2023-11-30 DIAGNOSIS — E78.2 MIXED HYPERLIPIDEMIA: ICD-10-CM

## 2023-11-30 DIAGNOSIS — R73.03 PREDIABETES: ICD-10-CM

## 2023-11-30 DIAGNOSIS — I51.7 LVH (LEFT VENTRICULAR HYPERTROPHY): ICD-10-CM

## 2023-11-30 PROCEDURE — 99213 OFFICE O/P EST LOW 20 MIN: CPT | Performed by: INTERNAL MEDICINE

## 2023-11-30 PROCEDURE — 3079F DIAST BP 80-89 MM HG: CPT | Performed by: INTERNAL MEDICINE

## 2023-11-30 PROCEDURE — 3074F SYST BP LT 130 MM HG: CPT | Performed by: INTERNAL MEDICINE

## 2023-11-30 PROCEDURE — 99396 PREV VISIT EST AGE 40-64: CPT | Performed by: INTERNAL MEDICINE

## 2023-11-30 PROCEDURE — 93000 ELECTROCARDIOGRAM COMPLETE: CPT | Performed by: INTERNAL MEDICINE

## 2023-11-30 RX ORDER — OXYBUTYNIN CHLORIDE 10 MG/1
10 TABLET, EXTENDED RELEASE ORAL DAILY
Qty: 90 TABLET | Refills: 1 | Status: SHIPPED | OUTPATIENT
Start: 2023-11-30

## 2023-11-30 RX ORDER — SIMVASTATIN 40 MG
40 TABLET ORAL NIGHTLY
Qty: 90 TABLET | Refills: 1 | Status: SHIPPED | OUTPATIENT
Start: 2023-11-30

## 2023-11-30 RX ORDER — BETAMETHASONE VALERATE 0.1 %
LOTION (ML) TOPICAL 2 TIMES DAILY
Qty: 60 ML | Refills: 0 | Status: SHIPPED | OUTPATIENT
Start: 2023-11-30

## 2023-11-30 RX ORDER — OMEPRAZOLE 40 MG/1
40 CAPSULE, DELAYED RELEASE ORAL
Qty: 30 CAPSULE | Refills: 1 | Status: SHIPPED | OUTPATIENT
Start: 2023-11-30

## 2023-11-30 ASSESSMENT — PATIENT HEALTH QUESTIONNAIRE - PHQ9
2. FEELING DOWN, DEPRESSED OR HOPELESS: 0
SUM OF ALL RESPONSES TO PHQ QUESTIONS 1-9: 0
SUM OF ALL RESPONSES TO PHQ9 QUESTIONS 1 & 2: 0
SUM OF ALL RESPONSES TO PHQ QUESTIONS 1-9: 0
1. LITTLE INTEREST OR PLEASURE IN DOING THINGS: 0
SUM OF ALL RESPONSES TO PHQ QUESTIONS 1-9: 0
SUM OF ALL RESPONSES TO PHQ QUESTIONS 1-9: 0

## 2023-11-30 NOTE — PROGRESS NOTES
Assessment/ Plan:   Zina Hernandez was seen today for follow-up chronic condition and annual exam.    Diagnoses and all orders for this visit:    Well woman exam (no gynecological exam)-Advised re: monthly self breast exam, dental prophylaxis Q 6 months, regular exercise, yearly eye exam, daily intake of Ca+D   -     CBC; Future  -     Comprehensive Metabolic Panel; Future  -     Lipid Panel; Future  -     SUSAN DIGITAL SCREEN W OR WO CAD BILATERAL; Future    Palpitations-etio? If labs normal, will do Holter and echo  -     TSH; Future  -     EKG 12 Lead  -     T3, Free; Future  -     T4, Free; Future    LVH (left ventricular hypertrophy)-likely from uncontrolled, HTN; advised to start takign the Toprol daily, will refer to Cardio    Essential hypertension-as above    Mixed hyperlipidemia-goal LDL of less than 100 on Zocor  -     simvastatin (ZOCOR) 40 MG tablet; Take 1 tablet by mouth nightly    Prediabetes-check A1c and start med if above 7%    Gastroesophageal reflux disease, unspecified whether esophagitis present-new sx; worried this could be heart related  -     External Referral To Gastroenterology  -     omeprazole (PRILOSEC) 40 MG delayed release capsule; Take 1 capsule by mouth every morning (before breakfast)    Hyperhidrosis  -     oxybutynin (DITROPAN-XL) 10 MG extended release tablet; Take 1 tablet by mouth daily    Chronic gout without tophus, unspecified cause, unspecified site-continue with daily Allopurinol  -     Uric Acid; Future    Encounter for screening mammogram for malignant neoplasm of breast  -     SUSAN DIGITAL SCREEN W OR WO CAD BILATERAL; Future    Other orders  -     betamethasone valerate (VALISONE) 0.1 % lotion; Apply topically 2 times daily        Follow-up and Dispositions    Return in about 3 months (around 2/29/2024) for follow up.                      Chief Complaint   Patient presents with    Follow-up Chronic Condition    Annual Exam       Pt is a 40y.o. year old female who presents

## 2023-11-30 NOTE — PROGRESS NOTES
1. \"Have you been to the ER, urgent care clinic since your last visit? Hospitalized since your last visit? \" No    2. \"Have you seen or consulted any other health care providers outside of the [address] [place] [address] since your last visit? \" No    3. For patients aged 43-73: Has the patient had a colonoscopy / FIT/ Cologuard? N/A      If the patient is female:    4. For patients aged 43-66: Has the patient had a mammogram within the past 2 years? Yes - Care Gap present. Most recent result on file    5. For patients aged 21-65: Has the patient had a pap smear?  Yes - no Care Gap present    Health Maintenance Due   Topic Date Due    Hepatitis B vaccine (2 of 3 - 19+ 3-dose series) 09/29/2004    Varicella vaccine (2 of 2 - 13+ 2-dose series) 09/29/2004    DTaP/Tdap/Td vaccine (3 - Td or Tdap) 12/01/2022    Flu vaccine (1) 08/01/2023    COVID-19 Vaccine (3 - 2023-24 season) 09/01/2023    Breast cancer screen  11/30/2023

## 2024-01-05 DIAGNOSIS — E78.2 MIXED HYPERLIPIDEMIA: ICD-10-CM

## 2024-01-07 RX ORDER — SIMVASTATIN 40 MG
40 TABLET ORAL NIGHTLY
Qty: 90 TABLET | Refills: 1 | Status: SHIPPED | OUTPATIENT
Start: 2024-01-07

## 2024-01-07 RX ORDER — METOPROLOL SUCCINATE 25 MG/1
25 TABLET, EXTENDED RELEASE ORAL DAILY
Qty: 90 TABLET | Refills: 1 | Status: SHIPPED | OUTPATIENT
Start: 2024-01-07

## 2024-01-13 LAB
A/G RATIO: 1.5 RATIO (ref 1.1–2.6)
ALBUMIN SERPL-MCNC: 4.7 G/DL (ref 3.5–5)
ALP BLD-CCNC: 72 U/L (ref 25–115)
ALT SERPL-CCNC: 39 U/L (ref 5–40)
ANION GAP SERPL CALCULATED.3IONS-SCNC: 13 MMOL/L (ref 3–15)
AST SERPL-CCNC: 32 U/L (ref 10–37)
BILIRUB SERPL-MCNC: 0.6 MG/DL (ref 0.2–1.2)
BUN BLDV-MCNC: 11 MG/DL (ref 6–22)
CALCIUM SERPL-MCNC: 9.5 MG/DL (ref 8.4–10.5)
CHLORIDE BLD-SCNC: 99 MMOL/L (ref 98–110)
CHOLESTEROL/HDL RATIO: 5.5 (ref 0–5)
CHOLESTEROL: 216 MG/DL (ref 110–200)
CO2: 26 MMOL/L (ref 20–32)
CREAT SERPL-MCNC: 0.5 MG/DL (ref 0.5–1.2)
GLOBULIN: 3.2 G/DL (ref 2–4)
GLOMERULAR FILTRATION RATE: >60 ML/MIN/1.73 SQ.M.
GLUCOSE: 108 MG/DL (ref 70–99)
HCT VFR BLD CALC: 40.4 % (ref 35.1–46.5)
HDLC SERPL-MCNC: 39 MG/DL
HEMOGLOBIN: 13.7 G/DL (ref 11.7–15.5)
LDL CHOLESTEROL CALCULATED: 139 MG/DL (ref 50–99)
LDL/HDL RATIO: 3.6
MCH RBC QN AUTO: 30 PG (ref 26–34)
MCHC RBC AUTO-ENTMCNC: 34 G/DL (ref 31–36)
MCV RBC AUTO: 88 FL (ref 80–99)
NON-HDL CHOLESTEROL: 177 MG/DL
PDW BLD-RTO: 12.9 % (ref 10–15.5)
PLATELET # BLD: 323 K/UL (ref 140–440)
PMV BLD AUTO: 10.5 FL (ref 9–13)
POTASSIUM SERPL-SCNC: 3.7 MMOL/L (ref 3.5–5.5)
RBC: 4.58 M/UL (ref 3.8–5.2)
SODIUM BLD-SCNC: 138 MMOL/L (ref 133–145)
T3 FREE: 3.1 PG/ML (ref 2.3–4.2)
T4 FREE: 1.3 NG/DL (ref 0.9–1.8)
TOTAL PROTEIN: 7.9 G/DL (ref 6.4–8.3)
TRIGL SERPL-MCNC: 189 MG/DL (ref 40–149)
TSH SERPL DL<=0.05 MIU/L-ACNC: 1.49 MCU/ML (ref 0.27–4.2)
URIC ACID: 7.2 MG/DL (ref 2.2–7.7)
VLDLC SERPL CALC-MCNC: 38 MG/DL (ref 8–30)
WBC: 7.5 K/UL (ref 4–11)

## 2024-04-09 ENCOUNTER — TELEPHONE (OUTPATIENT)
Facility: CLINIC | Age: 44
End: 2024-04-09

## 2024-04-09 NOTE — TELEPHONE ENCOUNTER
PT wanted to leave message for nurse, stated that the referral for cardiologist and gastrologist hasn't been sent over. PT stated that she tried to schedule an appt but the office stated that they never recieved it

## 2024-04-10 DIAGNOSIS — R00.2 PALPITATIONS: Primary | ICD-10-CM

## 2024-05-30 RX ORDER — ALLOPURINOL 100 MG/1
100 TABLET ORAL DAILY
Qty: 90 TABLET | Refills: 0 | Status: SHIPPED | OUTPATIENT
Start: 2024-05-30

## 2024-06-03 ENCOUNTER — OFFICE VISIT (OUTPATIENT)
Facility: CLINIC | Age: 44
End: 2024-06-03
Payer: COMMERCIAL

## 2024-06-03 ENCOUNTER — HOSPITAL ENCOUNTER (OUTPATIENT)
Facility: HOSPITAL | Age: 44
Setting detail: SPECIMEN
Discharge: HOME | End: 2024-06-06
Payer: COMMERCIAL

## 2024-06-03 VITALS
BODY MASS INDEX: 26.57 KG/M2 | RESPIRATION RATE: 16 BRPM | SYSTOLIC BLOOD PRESSURE: 133 MMHG | HEART RATE: 64 BPM | OXYGEN SATURATION: 98 % | DIASTOLIC BLOOD PRESSURE: 83 MMHG | TEMPERATURE: 98.4 F | WEIGHT: 144.4 LBS | HEIGHT: 62 IN

## 2024-06-03 DIAGNOSIS — K21.9 GASTROESOPHAGEAL REFLUX DISEASE, UNSPECIFIED WHETHER ESOPHAGITIS PRESENT: ICD-10-CM

## 2024-06-03 DIAGNOSIS — R00.2 PALPITATIONS: ICD-10-CM

## 2024-06-03 DIAGNOSIS — M1A.9XX0 CHRONIC GOUT WITHOUT TOPHUS, UNSPECIFIED CAUSE, UNSPECIFIED SITE: ICD-10-CM

## 2024-06-03 DIAGNOSIS — I51.7 LVH (LEFT VENTRICULAR HYPERTROPHY): ICD-10-CM

## 2024-06-03 DIAGNOSIS — I10 ESSENTIAL HYPERTENSION: ICD-10-CM

## 2024-06-03 DIAGNOSIS — E78.2 MIXED HYPERLIPIDEMIA: ICD-10-CM

## 2024-06-03 DIAGNOSIS — R61 HYPERHIDROSIS: ICD-10-CM

## 2024-06-03 DIAGNOSIS — R73.03 PREDIABETES: ICD-10-CM

## 2024-06-03 DIAGNOSIS — E78.2 MIXED HYPERLIPIDEMIA: Primary | ICD-10-CM

## 2024-06-03 LAB
ALBUMIN SERPL-MCNC: 4.8 G/DL (ref 3.4–5)
ALBUMIN/GLOB SERPL: 1.3 (ref 0.8–1.7)
ALP SERPL-CCNC: 78 U/L (ref 45–117)
ALT SERPL-CCNC: 50 U/L (ref 13–56)
ANION GAP SERPL CALC-SCNC: 5 MMOL/L (ref 3–18)
AST SERPL-CCNC: 34 U/L (ref 10–38)
BILIRUB SERPL-MCNC: 0.9 MG/DL (ref 0.2–1)
BUN SERPL-MCNC: 14 MG/DL (ref 7–18)
BUN/CREAT SERPL: 22 (ref 12–20)
CALCIUM SERPL-MCNC: 10.3 MG/DL (ref 8.5–10.1)
CHLORIDE SERPL-SCNC: 101 MMOL/L (ref 100–111)
CHOLEST SERPL-MCNC: 202 MG/DL
CO2 SERPL-SCNC: 30 MMOL/L (ref 21–32)
CREAT SERPL-MCNC: 0.64 MG/DL (ref 0.6–1.3)
ERYTHROCYTE [DISTWIDTH] IN BLOOD BY AUTOMATED COUNT: 12.9 % (ref 11.6–14.5)
EST. AVERAGE GLUCOSE BLD GHB EST-MCNC: 128 MG/DL
GLOBULIN SER CALC-MCNC: 3.7 G/DL (ref 2–4)
GLUCOSE SERPL-MCNC: 88 MG/DL (ref 74–99)
HBA1C MFR BLD: 6.1 % (ref 4.2–5.6)
HCT VFR BLD AUTO: 43.6 % (ref 35–45)
HDLC SERPL-MCNC: 44 MG/DL (ref 40–60)
HDLC SERPL: 4.6 (ref 0–5)
HGB BLD-MCNC: 14.6 G/DL (ref 12–16)
LDLC SERPL CALC-MCNC: 103.4 MG/DL (ref 0–100)
LIPID PANEL: ABNORMAL
MCH RBC QN AUTO: 30.2 PG (ref 24–34)
MCHC RBC AUTO-ENTMCNC: 33.5 G/DL (ref 31–37)
MCV RBC AUTO: 90.3 FL (ref 78–100)
NRBC # BLD: 0 K/UL (ref 0–0.01)
NRBC BLD-RTO: 0 PER 100 WBC
PLATELET # BLD AUTO: 339 K/UL (ref 135–420)
PMV BLD AUTO: 10.9 FL (ref 9.2–11.8)
POTASSIUM SERPL-SCNC: 4.3 MMOL/L (ref 3.5–5.5)
PROT SERPL-MCNC: 8.5 G/DL (ref 6.4–8.2)
RBC # BLD AUTO: 4.83 M/UL (ref 4.2–5.3)
SODIUM SERPL-SCNC: 136 MMOL/L (ref 136–145)
TRIGL SERPL-MCNC: 273 MG/DL
URATE SERPL-MCNC: 6.3 MG/DL (ref 2.6–7.2)
VLDLC SERPL CALC-MCNC: 54.6 MG/DL
WBC # BLD AUTO: 9 K/UL (ref 4.6–13.2)

## 2024-06-03 PROCEDURE — 85027 COMPLETE CBC AUTOMATED: CPT

## 2024-06-03 PROCEDURE — 3075F SYST BP GE 130 - 139MM HG: CPT | Performed by: INTERNAL MEDICINE

## 2024-06-03 PROCEDURE — 3079F DIAST BP 80-89 MM HG: CPT | Performed by: INTERNAL MEDICINE

## 2024-06-03 PROCEDURE — 36415 COLL VENOUS BLD VENIPUNCTURE: CPT

## 2024-06-03 PROCEDURE — 99214 OFFICE O/P EST MOD 30 MIN: CPT | Performed by: INTERNAL MEDICINE

## 2024-06-03 PROCEDURE — 84550 ASSAY OF BLOOD/URIC ACID: CPT

## 2024-06-03 PROCEDURE — 80053 COMPREHEN METABOLIC PANEL: CPT

## 2024-06-03 PROCEDURE — 80061 LIPID PANEL: CPT

## 2024-06-03 PROCEDURE — 83036 HEMOGLOBIN GLYCOSYLATED A1C: CPT

## 2024-06-03 RX ORDER — OXYBUTYNIN CHLORIDE 10 MG/1
10 TABLET, EXTENDED RELEASE ORAL DAILY
Qty: 90 TABLET | Refills: 1 | Status: SHIPPED | OUTPATIENT
Start: 2024-06-03

## 2024-06-03 RX ORDER — METOPROLOL SUCCINATE 25 MG/1
25 TABLET, EXTENDED RELEASE ORAL DAILY
Qty: 90 TABLET | Refills: 1 | Status: SHIPPED | OUTPATIENT
Start: 2024-06-03

## 2024-06-03 SDOH — ECONOMIC STABILITY: FOOD INSECURITY: WITHIN THE PAST 12 MONTHS, YOU WORRIED THAT YOUR FOOD WOULD RUN OUT BEFORE YOU GOT MONEY TO BUY MORE.: NEVER TRUE

## 2024-06-03 SDOH — ECONOMIC STABILITY: FOOD INSECURITY: WITHIN THE PAST 12 MONTHS, THE FOOD YOU BOUGHT JUST DIDN'T LAST AND YOU DIDN'T HAVE MONEY TO GET MORE.: NEVER TRUE

## 2024-06-03 SDOH — ECONOMIC STABILITY: INCOME INSECURITY: HOW HARD IS IT FOR YOU TO PAY FOR THE VERY BASICS LIKE FOOD, HOUSING, MEDICAL CARE, AND HEATING?: NOT HARD AT ALL

## 2024-06-03 ASSESSMENT — PATIENT HEALTH QUESTIONNAIRE - PHQ9
SUM OF ALL RESPONSES TO PHQ QUESTIONS 1-9: 0
SUM OF ALL RESPONSES TO PHQ QUESTIONS 1-9: 0
2. FEELING DOWN, DEPRESSED OR HOPELESS: NOT AT ALL
SUM OF ALL RESPONSES TO PHQ QUESTIONS 1-9: 0
SUM OF ALL RESPONSES TO PHQ QUESTIONS 1-9: 0
SUM OF ALL RESPONSES TO PHQ9 QUESTIONS 1 & 2: 0
1. LITTLE INTEREST OR PLEASURE IN DOING THINGS: NOT AT ALL

## 2024-06-03 NOTE — PROGRESS NOTES
\"Have you been to the ER, urgent care clinic since your last visit?  Hospitalized since your last visit?\"    NO    “Have you seen or consulted any other health care providers outside of [facility] since your last visit?”    Yes- Gastroenterologist on 5/29/24            Click Here for Release of Records Request    
kg/m².    Exam:   Appearance: alert, well appearing,  oriented to person, place, and time, acyanotic, in no respiratory distress and well hydrated.  HEENT:  NC/AT, pink conj, anicteric sclerae  Neck:  No cervical lymphadenopathy, no JVD, no thyromegaly, no carotid bruit  Heart:  RRR without M/R/G  Lungs:  CTAB, no rhonchi, rales, or wheezes with good air exchange   Abdomen:  Non-tender, pos bowel sounds, no hepatosplenomegaly  Ext:  No C/C/E    Skin: no rash  Neuro: no lateralizing signs, CNs II-XII intact            I have discussed the diagnosis with the patient and the intended plan as seen in the above orders.  The patient has received an After-Visit Summary and questions were answered concerning future plans.     Medication Side Effects and Warnings were discussed with patient: yes    Patient verbalized understanding of above instructions.    Ivana Jackson MD  Internal Medicine  [facility]

## 2024-06-12 DIAGNOSIS — I10 ESSENTIAL HYPERTENSION: ICD-10-CM

## 2024-06-12 RX ORDER — LISINOPRIL AND HYDROCHLOROTHIAZIDE 20; 12.5 MG/1; MG/1
1 TABLET ORAL DAILY
Qty: 90 TABLET | Refills: 1 | Status: SHIPPED | OUTPATIENT
Start: 2024-06-12

## 2024-06-12 RX ORDER — LISINOPRIL AND HYDROCHLOROTHIAZIDE 20; 12.5 MG/1; MG/1
1 TABLET ORAL DAILY
Qty: 90 TABLET | Refills: 1 | Status: CANCELLED | OUTPATIENT
Start: 2024-06-12

## 2024-06-12 NOTE — TELEPHONE ENCOUNTER
[facility] Clinical Staff  Phone Number: [phone]     Refills have been requested for the following medications:        lisinopril-hydroCHLOROthiazide (PRINZIDE;ZESTORETIC) 20-12.5 MG per tablet [Dr. Nicky Jackson MD]    Preferred pharmacy: [facility] #[id number] [place] [address] [id number] - P [phone] - F [phone]

## 2024-08-26 RX ORDER — ALLOPURINOL 100 MG/1
100 TABLET ORAL DAILY
Qty: 90 TABLET | Refills: 0 | Status: SHIPPED | OUTPATIENT
Start: 2024-08-26

## 2024-10-29 RX ORDER — METOPROLOL SUCCINATE 25 MG/1
25 TABLET, EXTENDED RELEASE ORAL DAILY
Qty: 90 TABLET | Refills: 1 | Status: SHIPPED | OUTPATIENT
Start: 2024-10-29

## 2024-11-24 DIAGNOSIS — E78.2 MIXED HYPERLIPIDEMIA: ICD-10-CM

## 2024-11-25 RX ORDER — ALLOPURINOL 100 MG/1
100 TABLET ORAL DAILY
Qty: 90 TABLET | Refills: 1 | Status: SHIPPED | OUTPATIENT
Start: 2024-11-25

## 2024-11-25 RX ORDER — SIMVASTATIN 40 MG
40 TABLET ORAL EVERY EVENING
Qty: 90 TABLET | Refills: 1 | Status: SHIPPED | OUTPATIENT
Start: 2024-11-25

## 2024-12-09 ENCOUNTER — OFFICE VISIT (OUTPATIENT)
Facility: CLINIC | Age: 44
End: 2024-12-09
Payer: COMMERCIAL

## 2024-12-09 VITALS
HEIGHT: 62 IN | SYSTOLIC BLOOD PRESSURE: 135 MMHG | HEART RATE: 77 BPM | RESPIRATION RATE: 16 BRPM | DIASTOLIC BLOOD PRESSURE: 87 MMHG | TEMPERATURE: 98.7 F | WEIGHT: 142 LBS | BODY MASS INDEX: 26.13 KG/M2 | OXYGEN SATURATION: 98 %

## 2024-12-09 DIAGNOSIS — R00.2 PALPITATIONS: ICD-10-CM

## 2024-12-09 DIAGNOSIS — R60.0 SWELLING OF LEFT PAROTID GLAND: ICD-10-CM

## 2024-12-09 DIAGNOSIS — Z00.00 WELL WOMAN EXAM (NO GYNECOLOGICAL EXAM): Primary | ICD-10-CM

## 2024-12-09 DIAGNOSIS — K21.9 GASTROESOPHAGEAL REFLUX DISEASE, UNSPECIFIED WHETHER ESOPHAGITIS PRESENT: ICD-10-CM

## 2024-12-09 DIAGNOSIS — I10 ESSENTIAL HYPERTENSION: ICD-10-CM

## 2024-12-09 DIAGNOSIS — I51.7 LVH (LEFT VENTRICULAR HYPERTROPHY): ICD-10-CM

## 2024-12-09 DIAGNOSIS — E78.2 MIXED HYPERLIPIDEMIA: ICD-10-CM

## 2024-12-09 DIAGNOSIS — M1A.9XX0 CHRONIC GOUT WITHOUT TOPHUS, UNSPECIFIED CAUSE, UNSPECIFIED SITE: ICD-10-CM

## 2024-12-09 DIAGNOSIS — R73.03 PREDIABETES: ICD-10-CM

## 2024-12-09 PROCEDURE — 3079F DIAST BP 80-89 MM HG: CPT | Performed by: INTERNAL MEDICINE

## 2024-12-09 PROCEDURE — 99396 PREV VISIT EST AGE 40-64: CPT | Performed by: INTERNAL MEDICINE

## 2024-12-09 PROCEDURE — 3075F SYST BP GE 130 - 139MM HG: CPT | Performed by: INTERNAL MEDICINE

## 2024-12-09 ASSESSMENT — PATIENT HEALTH QUESTIONNAIRE - PHQ9
SUM OF ALL RESPONSES TO PHQ9 QUESTIONS 1 & 2: 0
SUM OF ALL RESPONSES TO PHQ QUESTIONS 1-9: 0
2. FEELING DOWN, DEPRESSED OR HOPELESS: NOT AT ALL
1. LITTLE INTEREST OR PLEASURE IN DOING THINGS: NOT AT ALL
SUM OF ALL RESPONSES TO PHQ QUESTIONS 1-9: 0

## 2024-12-09 NOTE — PROGRESS NOTES
\"Have you been to the ER, urgent care clinic since your last visit?  Hospitalized since your last visit?\"    NO    “Have you seen or consulted any other health care providers outside our system since your last visit?”    Yes- follow up with Cardiology October- referral given   “Have you had a pap smear?”    YES - October 17,2024  Date of last Cervical Cancer screen (HPV or PAP): 11/7/2019              
answered concerning future plans.     Medication Side Effects and Warnings were discussed with patient: yes    Patient verbalized understanding of above instructions.    Ivana Jackson MD  Internal Medicine  [facility]

## 2024-12-12 DIAGNOSIS — I10 ESSENTIAL HYPERTENSION: ICD-10-CM

## 2024-12-16 RX ORDER — LISINOPRIL AND HYDROCHLOROTHIAZIDE 12.5; 2 MG/1; MG/1
1 TABLET ORAL DAILY
Qty: 90 TABLET | Refills: 1 | Status: SHIPPED | OUTPATIENT
Start: 2024-12-16

## 2025-02-08 LAB
A/G RATIO: 1.8 RATIO (ref 1.1–2.6)
ALBUMIN: 4.7 G/DL (ref 3.5–5)
ALP BLD-CCNC: 80 U/L (ref 25–115)
ALT SERPL-CCNC: 34 U/L (ref 5–40)
ANION GAP SERPL CALCULATED.3IONS-SCNC: 16 MMOL/L (ref 3–15)
AST SERPL-CCNC: 31 U/L (ref 10–37)
BILIRUB SERPL-MCNC: 0.5 MG/DL (ref 0.2–1.2)
BUN BLDV-MCNC: 13 MG/DL (ref 6–22)
CALCIUM SERPL-MCNC: 9.9 MG/DL (ref 8.4–10.5)
CHLORIDE BLD-SCNC: 100 MMOL/L (ref 98–110)
CHOLESTEROL, TOTAL: 114 MG/DL (ref 110–200)
CHOLESTEROL/HDL RATIO: 2.9 (ref 0–5)
CO2: 23 MMOL/L (ref 20–32)
CREAT SERPL-MCNC: 0.6 MG/DL (ref 0.5–1.2)
ESTIMATED AVERAGE GLUCOSE: 133 MG/DL (ref 91–123)
GFR, ESTIMATED: >60 ML/MIN/1.73 SQ.M.
GLOBULIN: 2.6 G/DL (ref 2–4)
GLUCOSE: 89 MG/DL (ref 70–99)
HBA1C MFR BLD: 6.3 % (ref 4.8–5.6)
HCT VFR BLD CALC: 40.6 % (ref 35.1–46.5)
HDLC SERPL-MCNC: 40 MG/DL
HEMOGLOBIN: 13.8 G/DL (ref 11.7–15.5)
LDL CHOLESTEROL: 18 MG/DL (ref 50–99)
LDL/HDL RATIO: 0.5
MCH RBC QN AUTO: 30 PG (ref 26–34)
MCHC RBC AUTO-ENTMCNC: 34 G/DL (ref 31–36)
MCV RBC AUTO: 89 FL (ref 80–99)
NON-HDL CHOLESTEROL: 74 MG/DL
PDW BLD-RTO: 12.3 % (ref 10–15.5)
PLATELET # BLD: 371 K/UL (ref 140–440)
PMV BLD AUTO: 10.3 FL (ref 9–13)
POTASSIUM SERPL-SCNC: 4.3 MMOL/L (ref 3.5–5.5)
RBC # BLD: 4.55 M/UL (ref 3.8–5.2)
SODIUM BLD-SCNC: 139 MMOL/L (ref 133–145)
TOTAL PROTEIN: 7.3 G/DL (ref 6.4–8.3)
TRIGL SERPL-MCNC: 276 MG/DL (ref 40–149)
URIC ACID: 6.6 MG/DL (ref 2.2–7.7)
VLDLC SERPL CALC-MCNC: 55 MG/DL (ref 8–30)
WBC # BLD: 8.4 K/UL (ref 4–11)

## 2025-03-10 DIAGNOSIS — R73.03 PREDIABETES: ICD-10-CM

## 2025-03-10 DIAGNOSIS — M1A.9XX0 CHRONIC GOUT WITHOUT TOPHUS, UNSPECIFIED CAUSE, UNSPECIFIED SITE: ICD-10-CM

## 2025-03-10 DIAGNOSIS — I10 ESSENTIAL HYPERTENSION: Primary | ICD-10-CM

## 2025-03-10 DIAGNOSIS — E78.2 MIXED HYPERLIPIDEMIA: ICD-10-CM

## 2025-04-09 ENCOUNTER — RESULTS FOLLOW-UP (OUTPATIENT)
Facility: CLINIC | Age: 45
End: 2025-04-09

## 2025-04-10 DIAGNOSIS — K21.9 GASTROESOPHAGEAL REFLUX DISEASE, UNSPECIFIED WHETHER ESOPHAGITIS PRESENT: ICD-10-CM

## 2025-04-10 RX ORDER — OMEPRAZOLE 40 MG/1
40 CAPSULE, DELAYED RELEASE ORAL DAILY PRN
Qty: 90 CAPSULE | Refills: 0 | Status: SHIPPED | OUTPATIENT
Start: 2025-04-10

## 2025-05-21 DIAGNOSIS — R61 HYPERHIDROSIS: ICD-10-CM

## 2025-05-21 RX ORDER — OXYBUTYNIN CHLORIDE 10 MG/1
10 TABLET, EXTENDED RELEASE ORAL DAILY
Qty: 90 TABLET | Refills: 1 | Status: CANCELLED | OUTPATIENT
Start: 2025-05-21

## 2025-05-21 RX ORDER — OXYBUTYNIN CHLORIDE 10 MG/1
10 TABLET, EXTENDED RELEASE ORAL NIGHTLY
Qty: 90 TABLET | Refills: 1 | Status: SHIPPED | OUTPATIENT
Start: 2025-05-21

## 2025-05-24 DIAGNOSIS — E78.2 MIXED HYPERLIPIDEMIA: ICD-10-CM

## 2025-05-27 NOTE — TELEPHONE ENCOUNTER
Requested Prescriptions     Pending Prescriptions Disp Refills    simvastatin (ZOCOR) 40 MG tablet [Pharmacy Med Name: SIMVASTATIN 40MG TABLETS] 90 tablet 1     Sig: TAKE 1 TABLET BY MOUTH EVERY DAY IN THE EVENING    allopurinol (ZYLOPRIM) 100 MG tablet [Pharmacy Med Name: ALLOPURINOL 100MG TABLETS] 90 tablet 1     Sig: TAKE 1 TABLET BY MOUTH DAILY     Last seen: 12/9/24  Next seen: 6/9/25    Last filled: 11/15/24 Qty 90 w/1 refill

## 2025-06-02 RX ORDER — ALLOPURINOL 100 MG/1
100 TABLET ORAL DAILY
Qty: 90 TABLET | Refills: 1 | Status: SHIPPED | OUTPATIENT
Start: 2025-06-02

## 2025-06-02 RX ORDER — SIMVASTATIN 40 MG
40 TABLET ORAL EVERY EVENING
Qty: 90 TABLET | Refills: 1 | Status: SHIPPED | OUTPATIENT
Start: 2025-06-02

## 2025-06-07 LAB
A/G RATIO: 1.6 RATIO (ref 1.1–2.6)
ALBUMIN: 4.6 G/DL (ref 3.5–5)
ALP BLD-CCNC: 82 U/L (ref 25–115)
ALT SERPL-CCNC: 31 U/L (ref 5–40)
ANION GAP SERPL CALCULATED.3IONS-SCNC: 15 MMOL/L (ref 3–15)
AST SERPL-CCNC: 29 U/L (ref 10–37)
BILIRUB SERPL-MCNC: 0.5 MG/DL (ref 0.2–1.2)
BUN BLDV-MCNC: 13 MG/DL (ref 6–22)
CALCIUM SERPL-MCNC: 10.1 MG/DL (ref 8.4–10.5)
CHLORIDE BLD-SCNC: 100 MMOL/L (ref 98–110)
CHOLESTEROL, TOTAL: 118 MG/DL (ref 110–200)
CHOLESTEROL/HDL RATIO: 3 (ref 0–5)
CO2: 25 MMOL/L (ref 20–32)
CREAT SERPL-MCNC: 0.5 MG/DL (ref 0.5–1.2)
ESTIMATED AVERAGE GLUCOSE: 134 MG/DL (ref 91–123)
GFR, ESTIMATED: >90 ML/MIN/1.73 SQ.M.
GLOBULIN: 2.9 G/DL (ref 2–4)
GLUCOSE: 97 MG/DL (ref 70–99)
HBA1C MFR BLD: 6.3 % (ref 4.8–5.6)
HDLC SERPL-MCNC: 40 MG/DL
LDL CHOLESTEROL: 19 MG/DL (ref 50–99)
LDL/HDL RATIO: 0.5
NON-HDL CHOLESTEROL: 78 MG/DL
POTASSIUM SERPL-SCNC: 4.3 MMOL/L (ref 3.5–5.5)
SODIUM BLD-SCNC: 140 MMOL/L (ref 133–145)
TOTAL PROTEIN: 7.5 G/DL (ref 6.4–8.3)
TRIGL SERPL-MCNC: 293 MG/DL (ref 40–149)
URIC ACID: 8.1 MG/DL (ref 2.2–7.7)
VLDLC SERPL CALC-MCNC: 59 MG/DL (ref 8–30)

## 2025-06-10 ENCOUNTER — E-VISIT (OUTPATIENT)
Facility: CLINIC | Age: 45
End: 2025-06-10
Payer: COMMERCIAL

## 2025-06-10 DIAGNOSIS — N39.0 URINARY TRACT INFECTION WITHOUT HEMATURIA, SITE UNSPECIFIED: Primary | ICD-10-CM

## 2025-06-10 PROCEDURE — 99421 OL DIG E/M SVC 5-10 MIN: CPT | Performed by: INTERNAL MEDICINE

## 2025-06-10 RX ORDER — CIPROFLOXACIN 250 MG/1
250 TABLET, FILM COATED ORAL 2 TIMES DAILY
Qty: 10 TABLET | Refills: 0 | Status: SHIPPED | OUTPATIENT
Start: 2025-06-10 | End: 2025-06-15

## 2025-06-10 NOTE — PROGRESS NOTES
Miya Jones (1980) initiated an asynchronous digital communication through [employer]    HPI: per patient questionnaire     Exam: not applicable    Diagnoses and all orders for this visit:  Diagnoses and all orders for this visit:    Urinary tract infection without hematuria, site unspecified  -     ciprofloxacin (CIPRO) 250 MG tablet; Take 1 tablet by mouth 2 times daily for 5 days          Less than 10 minutes were spent on the digital evaluation and management of this patient.    CANDIDA SPEARS MD

## 2025-06-24 RX ORDER — METOPROLOL SUCCINATE 25 MG/1
25 TABLET, EXTENDED RELEASE ORAL DAILY
Qty: 90 TABLET | Refills: 1 | Status: SHIPPED | OUTPATIENT
Start: 2025-06-24

## 2025-06-25 ENCOUNTER — RESULTS FOLLOW-UP (OUTPATIENT)
Facility: CLINIC | Age: 45
End: 2025-06-25

## 2025-06-25 ENCOUNTER — OFFICE VISIT (OUTPATIENT)
Facility: CLINIC | Age: 45
End: 2025-06-25
Payer: COMMERCIAL

## 2025-06-25 VITALS
RESPIRATION RATE: 16 BRPM | DIASTOLIC BLOOD PRESSURE: 76 MMHG | WEIGHT: 145 LBS | HEART RATE: 76 BPM | SYSTOLIC BLOOD PRESSURE: 112 MMHG | HEIGHT: 62 IN | TEMPERATURE: 97.7 F | OXYGEN SATURATION: 97 % | BODY MASS INDEX: 26.68 KG/M2

## 2025-06-25 DIAGNOSIS — E78.2 MIXED HYPERLIPIDEMIA: Primary | ICD-10-CM

## 2025-06-25 DIAGNOSIS — Z23 NEED FOR TETANUS, DIPHTHERIA, AND ACELLULAR PERTUSSIS (TDAP) VACCINE: ICD-10-CM

## 2025-06-25 DIAGNOSIS — R73.03 PREDIABETES: ICD-10-CM

## 2025-06-25 DIAGNOSIS — D24.2 FIBROADENOMA OF LEFT BREAST: ICD-10-CM

## 2025-06-25 DIAGNOSIS — Z98.890 S/P ENDOMETRIAL ABLATION: ICD-10-CM

## 2025-06-25 DIAGNOSIS — R00.2 PALPITATIONS: ICD-10-CM

## 2025-06-25 DIAGNOSIS — R61 HYPERHIDROSIS: ICD-10-CM

## 2025-06-25 DIAGNOSIS — I10 ESSENTIAL HYPERTENSION: ICD-10-CM

## 2025-06-25 PROCEDURE — 90715 TDAP VACCINE 7 YRS/> IM: CPT | Performed by: INTERNAL MEDICINE

## 2025-06-25 PROCEDURE — 90471 IMMUNIZATION ADMIN: CPT | Performed by: INTERNAL MEDICINE

## 2025-06-25 PROCEDURE — 99214 OFFICE O/P EST MOD 30 MIN: CPT | Performed by: INTERNAL MEDICINE

## 2025-06-25 PROCEDURE — 3074F SYST BP LT 130 MM HG: CPT | Performed by: INTERNAL MEDICINE

## 2025-06-25 PROCEDURE — 3078F DIAST BP <80 MM HG: CPT | Performed by: INTERNAL MEDICINE

## 2025-06-25 RX ORDER — SIMVASTATIN 20 MG
20 TABLET ORAL EVERY EVENING
Qty: 90 TABLET | Refills: 1 | Status: SHIPPED | OUTPATIENT
Start: 2025-06-25

## 2025-06-25 SDOH — ECONOMIC STABILITY: FOOD INSECURITY: WITHIN THE PAST 12 MONTHS, YOU WORRIED THAT YOUR FOOD WOULD RUN OUT BEFORE YOU GOT MONEY TO BUY MORE.: NEVER TRUE

## 2025-06-25 SDOH — ECONOMIC STABILITY: FOOD INSECURITY: WITHIN THE PAST 12 MONTHS, THE FOOD YOU BOUGHT JUST DIDN'T LAST AND YOU DIDN'T HAVE MONEY TO GET MORE.: NEVER TRUE

## 2025-06-25 ASSESSMENT — PATIENT HEALTH QUESTIONNAIRE - PHQ9
SUM OF ALL RESPONSES TO PHQ QUESTIONS 1-9: 0
1. LITTLE INTEREST OR PLEASURE IN DOING THINGS: NOT AT ALL
SUM OF ALL RESPONSES TO PHQ QUESTIONS 1-9: 0
2. FEELING DOWN, DEPRESSED OR HOPELESS: NOT AT ALL
SUM OF ALL RESPONSES TO PHQ QUESTIONS 1-9: 0
SUM OF ALL RESPONSES TO PHQ QUESTIONS 1-9: 0

## 2025-06-25 NOTE — PROGRESS NOTES
Have you been to the ER, urgent care clinic since your last visit?  Hospitalized since your last visit?   NO    Have you seen or consulted any other health care providers outside our system since your last visit?   NO    Injection of TDaP given by Alejandra Spann LPN. Patient observed. No signs nor symptoms of any adverse reactions.Patient tolerated injection well.

## 2025-06-25 NOTE — PROGRESS NOTES
Assessment/ Plan:   Miya was seen today for follow-up chronic condition.    Diagnoses and all orders for this visit:    Mixed hyperlipidemia-LDL now at goal on low dose Zocor and Repatha; discussed how to dec triglyceride level  -     simvastatin (ZOCOR) 20 MG tablet; Take 1 tablet by mouth every evening  -     Lipid Panel; Future    Prediabetes-continue to watch diet  -     Hemoglobin A1C; Future    Essential hypertension-controlled, continue with Toprol and Lisinopril HCT  -     CBC; Future  -     Comprehensive Metabolic Panel; Future    Hyperhidrosis-doing well on Ditropan as originally rxd by her Derm    Palpitations-saw Cardio, no arrythmia    Fibroadenoma of left breast-reassured    Need for tetanus, diphtheria, and acellular pertussis (Tdap) vaccine  -     Tdap, BOOSTRIX, (age 10 yrs+), IM       Missed a few doses of Allopurinol 100 mg-discussed goal uric acid is less than   S/p uterine ablation-doing well    Follow-up and Dispositions    Return in about 6 months (around 12/25/2025) for follow up, physical.                 Chief Complaint   Patient presents with    Follow-up Chronic Condition       Pt is a 44 y.o. year old female who presents for follow up of her chronic medical problems    Health Maintenance Due   Topic Date Due    Hepatitis B vaccine (2 of 3 - 19+ 3-dose series) 09/29/2004    Varicella vaccine (2 of 2 - 13+ 2-dose series) 09/29/2004    COVID-19 Vaccine (3 - 2024-25 season) 09/01/2024        Wt Readings from Last 3 Encounters:   06/25/25 65.8 kg (145 lb)   02/20/25 65.1 kg (143 lb 8.3 oz)   02/14/25 67.1 kg (148 lb)      BP Readings from Last 3 Encounters:   06/25/25 112/76   02/20/25 123/80   12/09/24 135/87   Meds reviewed: low dose Metoprolol and Lisinoprolol HCT      Lab Results   Component Value Date    CHOL 118 06/07/2025    TRIG 293 (H) 06/07/2025    HDL 40 06/07/2025    LDL 19 (L) 06/07/2025    VLDL 59 (H) 06/07/2025    CHOLHDLRATIO 3.0 06/07/2025    Now on Repatha and lower

## 2025-06-29 DIAGNOSIS — I10 ESSENTIAL HYPERTENSION: ICD-10-CM

## 2025-06-30 RX ORDER — LISINOPRIL AND HYDROCHLOROTHIAZIDE 12.5; 2 MG/1; MG/1
1 TABLET ORAL DAILY
Qty: 90 TABLET | Refills: 1 | Status: SHIPPED | OUTPATIENT
Start: 2025-06-30